# Patient Record
Sex: FEMALE | Race: WHITE
[De-identification: names, ages, dates, MRNs, and addresses within clinical notes are randomized per-mention and may not be internally consistent; named-entity substitution may affect disease eponyms.]

---
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
## 2021-01-07 ENCOUNTER — EMERGENCY (EMERGENCY)
Facility: HOSPITAL | Age: 21
LOS: 0 days | Discharge: HOME | End: 2021-01-07
Attending: EMERGENCY MEDICINE | Admitting: EMERGENCY MEDICINE
Payer: COMMERCIAL

## 2021-01-07 VITALS — HEART RATE: 54 BPM | DIASTOLIC BLOOD PRESSURE: 65 MMHG | SYSTOLIC BLOOD PRESSURE: 105 MMHG

## 2021-01-07 VITALS
HEIGHT: 64 IN | SYSTOLIC BLOOD PRESSURE: 121 MMHG | DIASTOLIC BLOOD PRESSURE: 68 MMHG | TEMPERATURE: 98 F | HEART RATE: 87 BPM | OXYGEN SATURATION: 97 % | WEIGHT: 270.07 LBS | RESPIRATION RATE: 20 BRPM

## 2021-01-07 DIAGNOSIS — R06.02 SHORTNESS OF BREATH: ICD-10-CM

## 2021-01-07 DIAGNOSIS — Z88.0 ALLERGY STATUS TO PENICILLIN: ICD-10-CM

## 2021-01-07 DIAGNOSIS — Z79.84 LONG TERM (CURRENT) USE OF ORAL HYPOGLYCEMIC DRUGS: ICD-10-CM

## 2021-01-07 DIAGNOSIS — R19.7 DIARRHEA, UNSPECIFIED: ICD-10-CM

## 2021-01-07 DIAGNOSIS — R11.2 NAUSEA WITH VOMITING, UNSPECIFIED: ICD-10-CM

## 2021-01-07 DIAGNOSIS — R05 COUGH: ICD-10-CM

## 2021-01-07 DIAGNOSIS — F17.200 NICOTINE DEPENDENCE, UNSPECIFIED, UNCOMPLICATED: ICD-10-CM

## 2021-01-07 DIAGNOSIS — F90.9 ATTENTION-DEFICIT HYPERACTIVITY DISORDER, UNSPECIFIED TYPE: ICD-10-CM

## 2021-01-07 DIAGNOSIS — R63.0 ANOREXIA: ICD-10-CM

## 2021-01-07 DIAGNOSIS — R07.89 OTHER CHEST PAIN: ICD-10-CM

## 2021-01-07 DIAGNOSIS — Z20.822 CONTACT WITH AND (SUSPECTED) EXPOSURE TO COVID-19: ICD-10-CM

## 2021-01-07 DIAGNOSIS — E11.9 TYPE 2 DIABETES MELLITUS WITHOUT COMPLICATIONS: ICD-10-CM

## 2021-01-07 LAB
ALBUMIN SERPL ELPH-MCNC: 4.2 G/DL — SIGNIFICANT CHANGE UP (ref 3.5–5.2)
ALP SERPL-CCNC: 54 U/L — SIGNIFICANT CHANGE UP (ref 30–115)
ALT FLD-CCNC: 25 U/L — SIGNIFICANT CHANGE UP (ref 14–37)
ANION GAP SERPL CALC-SCNC: 11 MMOL/L — SIGNIFICANT CHANGE UP (ref 7–14)
APAP SERPL-MCNC: <5 UG/ML — LOW (ref 10–30)
AST SERPL-CCNC: 26 U/L — SIGNIFICANT CHANGE UP (ref 14–37)
BASOPHILS # BLD AUTO: 0.05 K/UL — SIGNIFICANT CHANGE UP (ref 0–0.2)
BASOPHILS NFR BLD AUTO: 0.4 % — SIGNIFICANT CHANGE UP (ref 0–1)
BILIRUB SERPL-MCNC: 0.5 MG/DL — SIGNIFICANT CHANGE UP (ref 0.2–1.2)
BUN SERPL-MCNC: 12 MG/DL — SIGNIFICANT CHANGE UP (ref 10–20)
CALCIUM SERPL-MCNC: 9.4 MG/DL — SIGNIFICANT CHANGE UP (ref 8.5–10.1)
CHLORIDE SERPL-SCNC: 104 MMOL/L — SIGNIFICANT CHANGE UP (ref 98–110)
CO2 SERPL-SCNC: 24 MMOL/L — SIGNIFICANT CHANGE UP (ref 17–32)
CREAT SERPL-MCNC: 0.7 MG/DL — SIGNIFICANT CHANGE UP (ref 0.3–1)
EOSINOPHIL # BLD AUTO: 0.13 K/UL — SIGNIFICANT CHANGE UP (ref 0–0.7)
EOSINOPHIL NFR BLD AUTO: 0.9 % — SIGNIFICANT CHANGE UP (ref 0–8)
ETHANOL SERPL-MCNC: <10 MG/DL — SIGNIFICANT CHANGE UP
GLUCOSE SERPL-MCNC: 84 MG/DL — SIGNIFICANT CHANGE UP (ref 70–99)
HCT VFR BLD CALC: 38.6 % — SIGNIFICANT CHANGE UP (ref 37–47)
HGB BLD-MCNC: 12 G/DL — SIGNIFICANT CHANGE UP (ref 12–16)
IMM GRANULOCYTES NFR BLD AUTO: 0.4 % — HIGH (ref 0.1–0.3)
LIDOCAIN IGE QN: 23 U/L — SIGNIFICANT CHANGE UP (ref 7–60)
LYMPHOCYTES # BLD AUTO: 25.2 % — SIGNIFICANT CHANGE UP (ref 20.5–51.1)
LYMPHOCYTES # BLD AUTO: 3.5 K/UL — HIGH (ref 1.2–3.4)
MCHC RBC-ENTMCNC: 25.6 PG — LOW (ref 27–31)
MCHC RBC-ENTMCNC: 31.1 G/DL — LOW (ref 32–37)
MCV RBC AUTO: 82.5 FL — SIGNIFICANT CHANGE UP (ref 81–99)
MONOCYTES # BLD AUTO: 1.08 K/UL — HIGH (ref 0.1–0.6)
MONOCYTES NFR BLD AUTO: 7.8 % — SIGNIFICANT CHANGE UP (ref 1.7–9.3)
NEUTROPHILS # BLD AUTO: 9.1 K/UL — HIGH (ref 1.4–6.5)
NEUTROPHILS NFR BLD AUTO: 65.3 % — SIGNIFICANT CHANGE UP (ref 42.2–75.2)
NRBC # BLD: 0 /100 WBCS — SIGNIFICANT CHANGE UP (ref 0–0)
PLATELET # BLD AUTO: 363 K/UL — SIGNIFICANT CHANGE UP (ref 130–400)
POTASSIUM SERPL-MCNC: 4.1 MMOL/L — SIGNIFICANT CHANGE UP (ref 3.5–5)
POTASSIUM SERPL-SCNC: 4.1 MMOL/L — SIGNIFICANT CHANGE UP (ref 3.5–5)
PROT SERPL-MCNC: 7.6 G/DL — SIGNIFICANT CHANGE UP (ref 6.1–8)
RBC # BLD: 4.68 M/UL — SIGNIFICANT CHANGE UP (ref 4.2–5.4)
RBC # FLD: 13.2 % — SIGNIFICANT CHANGE UP (ref 11.5–14.5)
SALICYLATES SERPL-MCNC: <0.3 MG/DL — LOW (ref 4–30)
SODIUM SERPL-SCNC: 139 MMOL/L — SIGNIFICANT CHANGE UP (ref 135–146)
WBC # BLD: 13.91 K/UL — HIGH (ref 4.8–10.8)
WBC # FLD AUTO: 13.91 K/UL — HIGH (ref 4.8–10.8)

## 2021-01-07 PROCEDURE — 99285 EMERGENCY DEPT VISIT HI MDM: CPT

## 2021-01-07 PROCEDURE — 71045 X-RAY EXAM CHEST 1 VIEW: CPT | Mod: 26

## 2021-01-07 RX ORDER — SODIUM CHLORIDE 9 MG/ML
1000 INJECTION INTRAMUSCULAR; INTRAVENOUS; SUBCUTANEOUS ONCE
Refills: 0 | Status: COMPLETED | OUTPATIENT
Start: 2021-01-07 | End: 2021-01-07

## 2021-01-07 RX ADMIN — SODIUM CHLORIDE 1000 MILLILITER(S): 9 INJECTION INTRAMUSCULAR; INTRAVENOUS; SUBCUTANEOUS at 10:32

## 2021-01-07 RX ADMIN — SODIUM CHLORIDE 1000 MILLILITER(S): 9 INJECTION INTRAMUSCULAR; INTRAVENOUS; SUBCUTANEOUS at 11:28

## 2021-01-07 NOTE — ED PROVIDER NOTE - OBJECTIVE STATEMENT
21 yo F c/o body aches, chest discomfort, n/v, cough and decreased PO intake x1.5 days. +diarrhea today. No fevers, chills,  SOB, abdominal pains, or legs pains. + sick contact a few weeks ago. COVID test negative this past week at [facility].

## 2021-01-07 NOTE — ED PROVIDER NOTE - NSFOLLOWUPINSTRUCTIONS_ED_ALL_ED_FT
Nausea / Vomiting    Nausea is the feeling that you have an upset stomach or have to vomit. As nausea gets worse, it can lead to vomiting. Vomiting occurs when stomach contents are thrown up and out of the mouth which puts you at an increased risk for dehydration. Older adults and people with other diseases or a weak immune system are at higher risk for dehydration. Drink clear fluids in small but frequent amounts as tolerated. Eat bland, easy-to-digest foods in small amounts as tolerated.     Novel Coronavirus (COVID-19)  The Facts  What is a coronavirus?  Coronaviruses are a large family of viruses that cause illnesses ranging from the common cold  to more severe diseases such as Middle East Respiratory Syndrome (MERS) and Severe Acute  Respiratory Syndrome (SARS).  What is Novel Coronavirus (COVID-19)?  COVID-19 is a new strain of Coronavirus that has not been previously identified in humans. COVID-19  was identified in [place], [place], [place] in December 2019 (COVID-19). COVID-19 has  since been identified outside of [place], in a growing number of countries internationally, including  the [place].  Where can I find the most recent information about COVID-19?  The Centers for Disease Control and Prevention (CDC) is closely monitoring the outbreak caused by the  COVID-19. For the latest information about COVID- 19, visit the CDC website at  https://www.cdc.gov/coronavirus/index.html  How are coronaviruses spread?  Coronaviruses can be transmitted from person-to- person, usually after close contact with an infected person,  for example, in a household, workplace, or healthcare setting via droplets that become airborne after a cough  or sneeze by an affected person. These droplets can then infect a nearby person. It is likely transmission also  occurs by touching recently contaminated surfaces.  What are the symptoms of coronavirus infection?  It depends on the virus, but common signs include fever and/or respiratory symptoms such as  cough and shortness of breath. In more severe cases, infection can cause pneumonia, severe acute  respiratory syndrome, kidney failure and even death. Fortunately, most cases of COVID-19 have an  illness no different than the influenza “flu”. With a majority of these patients having mild symptoms  and overall mortality which appears to be not much different than the flu.  Is there a treatment for a COVID-19?  There is no specific treatment for disease caused by COVID-19. However, many of the symptoms can  be treated based on the patient’s clinical condition. Supportive care for infected persons can be highly  effective.  What can I do to protect myself?  Washing your hands, covering your cough, and disinfecting surfaces are the best precautionary  measures. It is also advisable to avoid close contact with anyone showing symptoms of respiratory  illness such as coughing and sneezing. Those with symptoms should wear a surgical mask when  around others.  What can I do to protect those around me?  If you have been identified as someone who may be infected with COVID-19, we recommend you  follow the self-isolation procedures outlined below to protect those around you and limit the spread  of this virus.   March 3, 2020  Recommendations for Patients Advised to Self-Isolate  for Possible COVID-19 Exposure  We recommend the below precautionary steps from now until 14 days from when you  returned from your travel or date of your last known possible contact:  - Do not go to work, school, or public areas. Avoid using public transportation, ride-sharing, or  taxis.  - As much as possible, separate yourself from other people in your home. If you can, you should  stay in a room and away from other people in your home. Also, you should use a separate  bathroom, if available.  - Wear the supplied mask whenever you are around other people.  - If you have a non-urgent medical appointment, please reschedule for a later date. If the  appointment is urgent, please call the healthcare provider and tell them that you are on selfisolation for possible COVID-19. This will help the healthcare provider’s office take steps to keep  other people from getting infected or exposed. If you can reschedule routine appointments, do  so.  - Wash your hands often with soap and water for at least 15 to 20 seconds or clean your hands  with an alcohol-based hand  that contains 60 to 95% alcohol, covering all surfaces of  your hands and rubbing them together until they feel dry. Soap and water should be used  preferentially if hands are visibly dirty.  - Cover your mouth and nose with a tissue when you cough or sneeze. Throw used tissues in a  lined trash can; immediately wash your hands.  - Avoid touching your eyes, nose, and mouth with your hands.  - Avoid sharing personal household items. You should not share dishes, drinking glasses, cups,  eating utensils, towels, or bedding with other people or pets in your home. After using these  items, they should be washed thoroughly with soap and water.  - Clean and disinfect all “high-touch” surfaces every day. High touch surfaces include counters,  tabletops, doorknobs, light switches, remote controls, bathroom fixtures, toilets, phones,  keyboards, tablets, and bedside tables. Also, clean any surfaces that may have blood, stool, or  body fluids on them.       If you develop worsening symptoms:  During your time on self-isolation do the following:  - Work from home if you are able to so.  - Limit social isolation by talking with friends and family on the phone or with face-time  - Talk with friends and relatives who don’t live with you about supporting each other if one  household has to be quarantined. For example, agree to drop groceries or other supplies at the  front door.  - Exercise and spend time outdoors away from others if able to do so.      You should return to the Emergency Department if you develop worse symptoms, trouble  breathing, chest pain, and/or a fever that doesn’t improve with over the counter  acetaminophen or ibuprofen.

## 2021-01-07 NOTE — ED ADULT NURSE NOTE - NSIMPLEMENTINTERV_GEN_ALL_ED
Implemented All Universal Safety Interventions:  [place] to call system. Call bell, personal items and telephone within reach. Instruct patient to call for assistance. Room bathroom lighting operational. Non-slip footwear when patient is off stretcher. Physically safe environment: no spills, clutter or unnecessary equipment. Stretcher in lowest position, wheels locked, appropriate side rails in place.

## 2021-01-07 NOTE — ED PROVIDER NOTE - NS ED ROS FT
Review of Systems    Constitutional: (-) fever, (-) chills  Eyes/ENT: (-) blurry vision, (-) epistaxis, (-) sore throat  Cardiovascular: (+) chest pain, (-) syncope  Respiratory: (+) cough, (-) shortness of breath  Gastrointestinal: (-) pain, (+) nausea, (+) vomiting, (+) diarrhea  Musculoskeletal: (-) neck pain, (-) back pain, (+) body aches  Integumentary: (-) rash, (-) edema  Neurological: (-) headache, (-) altered mental status  Psychiatric: (-) hallucinations  Allergic/Immunologic: (-) pruritus

## 2021-01-07 NOTE — ED PROVIDER NOTE - CLINICAL SUMMARY MEDICAL DECISION MAKING FREE TEXT BOX
Patient has improved with iv fluids pain is tolerable she is tolerating po food and fluid I will discharge at this time

## 2021-01-07 NOTE — ED PROVIDER NOTE - PROGRESS NOTE DETAILS
Patient refused COVID test, zofran, and tylenol. Patient reports feeling depressed to Dr Alexander. Denies homicidal or suicidal ideations. Advised outpatient f/u with psych. Patient feeling better I personally evaluated the patient. I reviewed the Resident’s or Physician Assistant’s note (as assigned above), and agree with the findings and plan except as documented in my note.  19 y/o F with no PMHx presents c/o CP and vague body pain “all over” x3 days. Pt states sxs are associated with 3 episodes of vomiting and 1 episode of diarrhea during this time, but none today. CP is localized to her sternum without radiation. She also states she is “depressed”, but gives no specific reason other than “a lot of things”. Denies SI/HI, visual changes and AVH. No fever, chills, cough, diaphoresis, numbness, weakness or tingling. On exam: NCAT. PERRLA, EOMI. OP clear. Lungs CTAB. RRR, S1S2 noted. Radial pulses 2+ and equal, pedal pulses 2+ and equal. Abdomen soft, NT/ND, no rebound or guarding. FROM x4 extremities. No focal neuro deficits. A/P: pt presented for vomiting and diarrhea, therefore administered IVF and obtained labs. Pt tolerating PO. In addition, pt had complaints of depression so tylenol salicylate levels were added, which were negative. EKG normal. Pt improved and d/c.

## 2021-01-07 NOTE — ED PROVIDER NOTE - ATTENDING CONTRIBUTION TO CARE
I was present for and supervised the key and critical aspects of the procedures performed during the care of the patient. I personally evaluated the patient. I reviewed the Resident’s or Physician Assistant’s note (as assigned above), and agree with the findings and plan except as documented in my note.  21 y/o F with no PMHx presents c/o CP and vague body pain “all over” x3 days. Pt states sxs are associated with 3 episodes of vomiting and 1 episode of diarrhea during this time, but none today. CP is localized to her sternum without radiation. She also states she is “depressed”, but gives no specific reason other than “a lot of things”. Denies SI/HI, visual changes and AVH. No fever, chills, cough, diaphoresis, numbness, weakness or tingling. On exam: NCAT. PERRLA, EOMI. OP clear. Lungs CTAB. RRR, S1S2 noted. Radial pulses 2+ and equal, pedal pulses 2+ and equal. Abdomen soft, NT/ND, no rebound or guarding. FROM x4 extremities. No focal neuro deficits. A/P: pt presented for vomiting and diarrhea, therefore administered IVF and obtained labs. Pt tolerating PO. In addition, pt had complaints of depression so tylenol salicylate levels were added, which were negative. EKG normal. Pt improved and d/c.

## 2021-04-09 ENCOUNTER — TRANSCRIPTION ENCOUNTER (OUTPATIENT)
Age: 21
End: 2021-04-09

## 2021-06-23 ENCOUNTER — APPOINTMENT (OUTPATIENT)
Dept: OTOLARYNGOLOGY | Facility: CLINIC | Age: 21
End: 2021-06-23
Payer: MEDICAID

## 2021-06-23 DIAGNOSIS — J30.9 ALLERGIC RHINITIS, UNSPECIFIED: ICD-10-CM

## 2021-06-23 DIAGNOSIS — R07.0 PAIN IN THROAT: ICD-10-CM

## 2021-06-23 PROCEDURE — 99072 ADDL SUPL MATRL&STAF TM PHE: CPT

## 2021-06-23 PROCEDURE — 99213 OFFICE O/P EST LOW 20 MIN: CPT

## 2021-06-23 RX ORDER — FAMOTIDINE 20 MG/1
20 TABLET, FILM COATED ORAL
Qty: 90 | Refills: 2 | Status: ACTIVE | COMMUNITY
Start: 2021-06-23 | End: 1900-01-01

## 2021-06-23 NOTE — HISTORY OF PRESENT ILLNESS
[de-identified] : 12/3/19 Patient is present today for right sided throat pain, and b/l otalgia for 1 month. patient was instructed to use ibuprofen for pain as per urgent care, but patient is still in pain. patient also states she has nasal pain, and facial pain. she currently has a cough that has a metallic taste mucosa. patient refuses endoscopy.  [FreeTextEntry1] : 06/23/21 : Patient presents today c/o throat discomfort. She is constantly clearing throat. Has excessive saliva , constantly having to spit it up .

## 2021-08-17 ENCOUNTER — TRANSCRIPTION ENCOUNTER (OUTPATIENT)
Age: 21
End: 2021-08-17

## 2021-10-21 ENCOUNTER — EMERGENCY (EMERGENCY)
Facility: HOSPITAL | Age: 21
LOS: 0 days | Discharge: HOME | End: 2021-10-21
Attending: EMERGENCY MEDICINE | Admitting: EMERGENCY MEDICINE
Payer: MEDICAID

## 2021-10-21 VITALS
HEART RATE: 80 BPM | TEMPERATURE: 99 F | DIASTOLIC BLOOD PRESSURE: 71 MMHG | SYSTOLIC BLOOD PRESSURE: 126 MMHG | HEIGHT: 64 IN

## 2021-10-21 DIAGNOSIS — Z86.59 PERSONAL HISTORY OF OTHER MENTAL AND BEHAVIORAL DISORDERS: ICD-10-CM

## 2021-10-21 DIAGNOSIS — K92.1 MELENA: ICD-10-CM

## 2021-10-21 DIAGNOSIS — R10.30 LOWER ABDOMINAL PAIN, UNSPECIFIED: ICD-10-CM

## 2021-10-21 DIAGNOSIS — Z88.0 ALLERGY STATUS TO PENICILLIN: ICD-10-CM

## 2021-10-21 DIAGNOSIS — E11.9 TYPE 2 DIABETES MELLITUS WITHOUT COMPLICATIONS: ICD-10-CM

## 2021-10-21 DIAGNOSIS — G89.29 OTHER CHRONIC PAIN: ICD-10-CM

## 2021-10-21 DIAGNOSIS — Z79.84 LONG TERM (CURRENT) USE OF ORAL HYPOGLYCEMIC DRUGS: ICD-10-CM

## 2021-10-21 DIAGNOSIS — Z87.42 PERSONAL HISTORY OF OTHER DISEASES OF THE FEMALE GENITAL TRACT: ICD-10-CM

## 2021-10-21 DIAGNOSIS — R10.9 UNSPECIFIED ABDOMINAL PAIN: ICD-10-CM

## 2021-10-21 LAB
ALBUMIN SERPL ELPH-MCNC: 4.3 G/DL — SIGNIFICANT CHANGE UP (ref 3.5–5.2)
ALP SERPL-CCNC: 69 U/L — SIGNIFICANT CHANGE UP (ref 30–115)
ALT FLD-CCNC: 16 U/L — SIGNIFICANT CHANGE UP (ref 14–37)
ANION GAP SERPL CALC-SCNC: 13 MMOL/L — SIGNIFICANT CHANGE UP (ref 7–14)
APPEARANCE UR: CLEAR — SIGNIFICANT CHANGE UP
AST SERPL-CCNC: 20 U/L — SIGNIFICANT CHANGE UP (ref 14–37)
BASOPHILS # BLD AUTO: 0.06 K/UL — SIGNIFICANT CHANGE UP (ref 0–0.2)
BASOPHILS NFR BLD AUTO: 0.5 % — SIGNIFICANT CHANGE UP (ref 0–1)
BILIRUB SERPL-MCNC: 0.3 MG/DL — SIGNIFICANT CHANGE UP (ref 0.2–1.2)
BILIRUB UR-MCNC: NEGATIVE — SIGNIFICANT CHANGE UP
BUN SERPL-MCNC: 12 MG/DL — SIGNIFICANT CHANGE UP (ref 10–20)
CALCIUM SERPL-MCNC: 9.4 MG/DL — SIGNIFICANT CHANGE UP (ref 8.5–10.1)
CHLORIDE SERPL-SCNC: 106 MMOL/L — SIGNIFICANT CHANGE UP (ref 98–110)
CO2 SERPL-SCNC: 19 MMOL/L — SIGNIFICANT CHANGE UP (ref 17–32)
COLOR SPEC: YELLOW — SIGNIFICANT CHANGE UP
CREAT SERPL-MCNC: 0.7 MG/DL — SIGNIFICANT CHANGE UP (ref 0.3–1)
DIFF PNL FLD: NEGATIVE — SIGNIFICANT CHANGE UP
EOSINOPHIL # BLD AUTO: 0.19 K/UL — SIGNIFICANT CHANGE UP (ref 0–0.7)
EOSINOPHIL NFR BLD AUTO: 1.4 % — SIGNIFICANT CHANGE UP (ref 0–8)
GLUCOSE SERPL-MCNC: 89 MG/DL — SIGNIFICANT CHANGE UP (ref 70–99)
GLUCOSE UR QL: NEGATIVE MG/DL — SIGNIFICANT CHANGE UP
HCT VFR BLD CALC: 43.3 % — SIGNIFICANT CHANGE UP (ref 37–47)
HGB BLD-MCNC: 13.7 G/DL — SIGNIFICANT CHANGE UP (ref 12–16)
IMM GRANULOCYTES NFR BLD AUTO: 0.7 % — HIGH (ref 0.1–0.3)
KETONES UR-MCNC: NEGATIVE — SIGNIFICANT CHANGE UP
LEUKOCYTE ESTERASE UR-ACNC: NEGATIVE — SIGNIFICANT CHANGE UP
LYMPHOCYTES # BLD AUTO: 42.9 % — SIGNIFICANT CHANGE UP (ref 20.5–51.1)
LYMPHOCYTES # BLD AUTO: 5.66 K/UL — HIGH (ref 1.2–3.4)
MCHC RBC-ENTMCNC: 26 PG — LOW (ref 27–31)
MCHC RBC-ENTMCNC: 31.6 G/DL — LOW (ref 32–37)
MCV RBC AUTO: 82.2 FL — SIGNIFICANT CHANGE UP (ref 81–99)
MONOCYTES # BLD AUTO: 1.03 K/UL — HIGH (ref 0.1–0.6)
MONOCYTES NFR BLD AUTO: 7.8 % — SIGNIFICANT CHANGE UP (ref 1.7–9.3)
NEUTROPHILS # BLD AUTO: 6.15 K/UL — SIGNIFICANT CHANGE UP (ref 1.4–6.5)
NEUTROPHILS NFR BLD AUTO: 46.7 % — SIGNIFICANT CHANGE UP (ref 42.2–75.2)
NITRITE UR-MCNC: NEGATIVE — SIGNIFICANT CHANGE UP
NRBC # BLD: 0 /100 WBCS — SIGNIFICANT CHANGE UP (ref 0–0)
PH UR: 6.5 — SIGNIFICANT CHANGE UP (ref 5–8)
PLATELET # BLD AUTO: 469 K/UL — HIGH (ref 130–400)
POTASSIUM SERPL-MCNC: 5.1 MMOL/L — HIGH (ref 3.5–5)
POTASSIUM SERPL-SCNC: 5.1 MMOL/L — HIGH (ref 3.5–5)
PROT SERPL-MCNC: 7.8 G/DL — SIGNIFICANT CHANGE UP (ref 6.1–8)
PROT UR-MCNC: NEGATIVE MG/DL — SIGNIFICANT CHANGE UP
RBC # BLD: 5.27 M/UL — SIGNIFICANT CHANGE UP (ref 4.2–5.4)
RBC # FLD: 13.1 % — SIGNIFICANT CHANGE UP (ref 11.5–14.5)
SODIUM SERPL-SCNC: 138 MMOL/L — SIGNIFICANT CHANGE UP (ref 135–146)
SP GR SPEC: 1.02 — SIGNIFICANT CHANGE UP (ref 1.01–1.03)
UROBILINOGEN FLD QL: 0.2 MG/DL — SIGNIFICANT CHANGE UP
WBC # BLD: 13.18 K/UL — HIGH (ref 4.8–10.8)
WBC # FLD AUTO: 13.18 K/UL — HIGH (ref 4.8–10.8)

## 2021-10-21 PROCEDURE — 76856 US EXAM PELVIC COMPLETE: CPT | Mod: 26

## 2021-10-21 PROCEDURE — 99285 EMERGENCY DEPT VISIT HI MDM: CPT

## 2021-10-21 RX ORDER — KETOROLAC TROMETHAMINE 30 MG/ML
15 SYRINGE (ML) INJECTION ONCE
Refills: 0 | Status: DISCONTINUED | OUTPATIENT
Start: 2021-10-21 | End: 2021-10-21

## 2021-10-21 RX ORDER — SODIUM CHLORIDE 9 MG/ML
1000 INJECTION INTRAMUSCULAR; INTRAVENOUS; SUBCUTANEOUS ONCE
Refills: 0 | Status: COMPLETED | OUTPATIENT
Start: 2021-10-21 | End: 2021-10-21

## 2021-10-21 RX ADMIN — Medication 15 MILLIGRAM(S): at 15:02

## 2021-10-21 RX ADMIN — SODIUM CHLORIDE 1000 MILLILITER(S): 9 INJECTION INTRAMUSCULAR; INTRAVENOUS; SUBCUTANEOUS at 15:02

## 2021-10-21 NOTE — ED PROVIDER NOTE - PHYSICAL EXAMINATION
Physical Exam    Vital Signs: I have reviewed the initial vital signs.  Constitutional: well-nourished, appears stated age, no acute distress  Cardiovascular: S1 and S2, regular rate, regular rhythm, well-perfused extremities, radial pulses equal and 2+  Respiratory: unlabored respiratory effort, clear to auscultation bilaterally no wheezing, rales and rhonchi  Gastrointestinal: soft, (+) diffuse lower ABD tenderness, no rebound or guarding, no pulsatile mass, normal bowl sounds  Musculoskeletal: supple neck, no lower extremity edema, no midline tenderness  Integumentary: warm, dry, no rash  Neurologic: awake, alert, motor and sensory functions grossly intact  Psychiatric: appropriate mood, appropriate affect Physical Exam    Vital Signs: I have reviewed the initial vital signs.  Constitutional: well-nourished, appears stated age, no acute distress  Cardiovascular: S1 and S2, regular rate, regular rhythm, well-perfused extremities, radial pulses equal and 2+  Respiratory: unlabored respiratory effort, clear to auscultation bilaterally no wheezing, rales and rhonchi  Gastrointestinal: soft, (+) diffuse lower ABD tenderness, no rebound or guarding, no pulsatile mass, normal bowl sounds. Rectal: No blood, hemorrhoids, or fissures.    Musculoskeletal: supple neck, no lower extremity edema, no midline tenderness  Integumentary: warm, dry, no rash  Neurologic: awake, alert, motor and sensory functions grossly intact  Psychiatric: appropriate mood, appropriate affect Physical Exam    Vital Signs: I have reviewed the initial vital signs.  Constitutional: well-nourished, appears stated age, no acute distress  Cardiovascular: S1 and S2, regular rate, regular rhythm, well-perfused extremities, radial pulses equal and 2+  Respiratory: unlabored respiratory effort, clear to auscultation bilaterally no wheezing, rales and rhonchi  Gastrointestinal: soft, (+) diffuse lower ABD tenderness, no rebound or guarding, no pulsatile mass, normal bowl sounds. Rectal: No blood, hemorrhoids, or fissures.    Musculoskeletal: supple neck, no lower extremity edema, no midline tenderness  Integumentary: warm, dry, no rash  Neurologic: awake, alert, motor and sensory functions grossly intact

## 2021-10-21 NOTE — ED PROVIDER NOTE - ATTENDING CONTRIBUTION TO CARE
pt with chronic abd pain found to have gallstones and ovarian cyst now with lower abd pain and brbpr, PE as above, will cehck imaging

## 2021-10-21 NOTE — ED PROVIDER NOTE - CLINICAL SUMMARY MEDICAL DECISION MAKING FREE TEXT BOX
pt with chronic abd pain found to have gallstones and ovarian cyst now with lower abd pain and brbpr, PE as above, labs and studies reviewed, will d/c to f/u as scheduled. Patient counseled regarding conditions which should prompt return.

## 2021-10-21 NOTE — ED PROVIDER NOTE - PATIENT PORTAL LINK FT
You can access the FollowMyHealth Patient Portal offered by [facility] by registering at the following website: [email or web address]. By joining [employer]’s FollowMyHealth portal, you will also be able to view your health information using other applications (apps) compatible with our system.

## 2021-10-21 NOTE — ED PROVIDER NOTE - OBJECTIVE STATEMENT
21 y/o F PMH ADD, polycystic ovary, depression and DM type 2 presents to the ED c/o diffuse ABD pain, now radiating to her suprapubic region. Pt states that over the past few months, the pain has been progressively getting worse, non-radiating, but associated with bloody stools. Pt has been evaluated for this pain in the past with labs and US, which were positive for platelets of 400 and noted gallstones. Pt denies any recent fevers, chills, cough, CP, SOB, back pain or decrease in PO intake.

## 2021-10-21 NOTE — ED PROVIDER NOTE - NS ED ROS FT
Constitutional: (-) fever, (-) chills  Eyes: (-) visual changes  ENT: (-) nasal congestions  Cardiovascular: (-) chest pain, (-) syncope  Respiratory: (-) cough, (-) shortness of breath, (-) dyspnea,   Gastrointestinal: (-) vomiting, (-) diarrhea, (-)nausea, (+) ABD pain  Musculoskeletal: (-) neck pain, (-) back pain, (-) joint pain,  Integumentary: (-) rash, (-) edema, (-) bruises  Neurological: (-) headache, (-) loc, (-) dizziness, (-) tingling, (-)numbness,  Psychiatric: (-) hallucinations, (-)nervousness, (-)depression, (-)SI/HI  Allergic/Immunologic: (-) pruritus

## 2021-10-22 NOTE — ASU PREOP CHECKLIST, PEDIATRIC - RESPIRATORY RATE (BREATHS/MIN)
Anesthesia Evaluation     Patient summary reviewed and Nursing notes reviewed   no history of anesthetic complications:  NPO Solid Status: > 8 hours  NPO Liquid Status: > 2 hours           Airway   Mallampati: II  TM distance: >3 FB  Neck ROM: full  No difficulty expected  Dental    (+) edentulous    Pulmonary - normal exam    breath sounds clear to auscultation  (+) a smoker Former,   Cardiovascular - normal exam  Exercise tolerance: poor (<4 METS)    Rhythm: regular    (+) hypertension, hyperlipidemia,       Neuro/Psych  (+) TIA,     GI/Hepatic/Renal/Endo    (+)  GERD,      Musculoskeletal     Abdominal    Substance History      OB/GYN          Other      history of cancer (prior lung CA, now epiglottic mass, new cerebellar mass) active                    Anesthesia Plan    ASA 4     general and MAC   total IV anesthesia    Anesthetic plan, all risks, benefits, and alternatives have been provided, discussed and informed consent has been obtained with: patient and child.      
18

## 2021-10-23 LAB
CULTURE RESULTS: SIGNIFICANT CHANGE UP
SPECIMEN SOURCE: SIGNIFICANT CHANGE UP

## 2021-10-24 NOTE — ED PROVIDER NOTE - NS ED ATTENDING STATEMENT MOD
Yes - the patient is able to be screened
I have personally performed a face to face diagnostic evaluation on this patient. I have reviewed the ACP note and agree with the history, exam and plan of care, except as noted.

## 2022-01-31 ENCOUNTER — EMERGENCY (EMERGENCY)
Facility: HOSPITAL | Age: 22
LOS: 0 days | Discharge: HOME | End: 2022-01-31
Attending: EMERGENCY MEDICINE | Admitting: EMERGENCY MEDICINE
Payer: MEDICAID

## 2022-01-31 VITALS
HEART RATE: 92 BPM | RESPIRATION RATE: 18 BRPM | DIASTOLIC BLOOD PRESSURE: 80 MMHG | SYSTOLIC BLOOD PRESSURE: 139 MMHG | OXYGEN SATURATION: 100 %

## 2022-01-31 VITALS
HEART RATE: 120 BPM | SYSTOLIC BLOOD PRESSURE: 148 MMHG | RESPIRATION RATE: 18 BRPM | OXYGEN SATURATION: 100 % | DIASTOLIC BLOOD PRESSURE: 92 MMHG | TEMPERATURE: 98 F

## 2022-01-31 DIAGNOSIS — L02.415 CUTANEOUS ABSCESS OF RIGHT LOWER LIMB: ICD-10-CM

## 2022-01-31 DIAGNOSIS — Z79.84 LONG TERM (CURRENT) USE OF ORAL HYPOGLYCEMIC DRUGS: ICD-10-CM

## 2022-01-31 DIAGNOSIS — Z88.0 ALLERGY STATUS TO PENICILLIN: ICD-10-CM

## 2022-01-31 PROCEDURE — 99284 EMERGENCY DEPT VISIT MOD MDM: CPT | Mod: 25

## 2022-01-31 PROCEDURE — 76882 US LMTD JT/FCL EVL NVASC XTR: CPT | Mod: 26

## 2022-01-31 PROCEDURE — 10060 I&D ABSCESS SIMPLE/SINGLE: CPT

## 2022-01-31 RX ORDER — AZTREONAM 2 G
1 VIAL (EA) INJECTION
Qty: 14 | Refills: 0
Start: 2022-01-31 | End: 2022-02-06

## 2022-01-31 RX ORDER — SACCHAROMYCES BOULARDII 250 MG
1 POWDER IN PACKET (EA) ORAL
Qty: 14 | Refills: 0
Start: 2022-01-31 | End: 2022-02-13

## 2022-01-31 NOTE — ED PROVIDER NOTE - OBJECTIVE STATEMENT
21 F pmhx of recurrent abscess to the inner right thigh presents tot the ED for abscess to the right thigh. pt stats she is coming in today because the pain has gotten too much and she is unable to walk. pt states over the last few years she has gotten them and they usually go away on their own. denies any relieving factors. denies any itching or drainage from the wound.

## 2022-01-31 NOTE — ED PROVIDER NOTE - CLINICAL SUMMARY MEDICAL DECISION MAKING FREE TEXT BOX
21-year-old woman with history of recurrent inner thigh abscesses.  Patient had recurrence of right thigh abscess.  No fever no lethargy no signs of severe sepsis or septic shock.  No evidence of necrotizing fasciitis.  Ultrasound revealed abscess.  I&D performed with good amount of purulent drainage.  Stable for discharge with oral antibiotics Florastor outpatient follow-up.  Patient to return for any new or worsening symptoms.

## 2022-01-31 NOTE — ED PROVIDER NOTE - NSFOLLOWUPCLINICS_GEN_ALL_ED_FT
[facility]  OB/GYN  [address] [place] [address]  Phone: [phone]  Fax:     [facility]  Family Medicine  [address] [place] [address]  Phone: [phone]  Fax:

## 2022-01-31 NOTE — ED PROVIDER NOTE - PATIENT PORTAL LINK FT
You can access the FollowMyHealth Patient Portal offered by [facility] by registering at the following website: [email or web address]. By joining [employer]’s FollowMyHealth portal, you will also be able to view your health information using other applications (apps) compatible with our system.

## 2022-01-31 NOTE — ED PROVIDER NOTE - NS ED ROS FT
Review of Systems  Constitutional:  No fever, chills, malaise, generalized weakness.  Cardiac:  No chest pain,  Respiratory:  No dyspnea,  :  No dysuria, hematuria, frequency, or burning. Normal urine output.   Skin:  No skin rash, pruritis, jaundice, + lesions.   Neuro:  No headache, dizziness,

## 2022-01-31 NOTE — ED PROVIDER NOTE - PHYSICAL EXAMINATION
English VITAL SIGNS: I have reviewed nursing notes and confirm.  CONSTITUTIONAL: Well-developed; well-nourished; in no acute distress.  SKIN: Skin exam is warm and dry, acute rash to the right groin signs of scarring from previous lesions. large fluctuant lesion on the right thigh, erythema and swelling   CARD: S1, S2 normal; no murmurs, gallops, or rubs. Regular rate and rhythm.  RESP: No wheezes, rales or rhonchi. Speaking in full sentences.

## 2022-01-31 NOTE — ED PROCEDURE NOTE - ATTENDING CONTRIBUTION TO CARE
I personally supervised the study.  I reviewed the images and interpretation by the resident/ACP and have edited where appropriate.    Patient came in with swelling I intend to get a bedside soft tissue US    Soft tissue US showed abscess
I was present for and supervised the key and critical aspects of the procedures performed during the care of the patient.  I&D

## 2022-03-09 ENCOUNTER — EMERGENCY (EMERGENCY)
Facility: HOSPITAL | Age: 22
LOS: 0 days | Discharge: HOME | End: 2022-03-09
Attending: EMERGENCY MEDICINE | Admitting: EMERGENCY MEDICINE
Payer: MEDICAID

## 2022-03-09 VITALS
WEIGHT: 285.94 LBS | RESPIRATION RATE: 19 BRPM | TEMPERATURE: 99 F | SYSTOLIC BLOOD PRESSURE: 113 MMHG | HEART RATE: 101 BPM | HEIGHT: 64 IN | OXYGEN SATURATION: 97 % | DIASTOLIC BLOOD PRESSURE: 57 MMHG

## 2022-03-09 DIAGNOSIS — Z88.0 ALLERGY STATUS TO PENICILLIN: ICD-10-CM

## 2022-03-09 DIAGNOSIS — S21.012A LACERATION WITHOUT FOREIGN BODY OF LEFT BREAST, INITIAL ENCOUNTER: ICD-10-CM
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
## 2022-03-09 PROCEDURE — 99283 EMERGENCY DEPT VISIT LOW MDM: CPT

## 2022-03-09 NOTE — ED PROVIDER NOTE - OBJECTIVE STATEMENT
this is a 20 yo female presents to ed for evaluation . patient states she had a nipple ring in and it was accidently ripped out. patient came to ed for further evaluation.

## 2022-03-09 NOTE — ED ADULT TRIAGE NOTE - DIRECT TO ROOM CARE INITIATED:
09-Mar-2022 14:08 72y Female presents today for presurgical testing for CYSTOSCOPY RIGHT URETEROSCOPY LASER LITHOTRIPSY STENT EXCHANGE. Patient reports vomiting and back pain since last Tuesday 2/8/22, for which she presented to the ER for evaluation. Workup revealed a stone in the ureter. She currently denies pain but endorses having had sharp pain radiating from her right costovertebral angle around to her right groin area, and was a 9/10 on pain scale. She states that she was treated with antibiotics and a ureteral stent was placed during that visit. She currently denies any pain or any hematuria. She offers no other complaints at this time.   Patient denies any CP, palpitations, SOB, cough, or dysuria. No recent URI or UTI. Recently treated with antibiotics for kidney stones  Stated exercise tolerance is FOS 3-4            JAKE screen reviewed    Patient denies any recent personal exposure to COVID19. Denies any sick contacts. Patient denies any travel within the past 30 days. Patient was instructed to quarantine until after procedure    N20.1 40242  Calculus of ureter  Encounter for other preprocedural examination  ^N20.1 47848    PMH/PSH/FH  Calculus of ureter    Anesthesia Alert  NO--Difficult Airway  NO--History of neck surgery or radiation  NO--Limited ROM of neck  NO--History of Malignant hyperthermia  NO--Personal or family history of Pseudocholinesterase deficiency.  NO--Prior Anesthesia Complication  NO--Latex Allergy  NO--Loose teeth  NO--History of Rheumatoid Arthritis  NO--JAKE  NO--Bleeding risk  NO--Other_____    Patient states that this is their complete medical history and list of medications

## 2022-03-09 NOTE — ED PROVIDER NOTE - PATIENT PORTAL LINK FT
You can access the FollowMyHealth Patient Portal offered by [facility] by registering at the following website: [email or web address]. By joining [employer]’s FollowMyHealth portal, you will also be able to view your health information using other applications (apps) compatible with our system.

## 2022-03-09 NOTE — ED PROVIDER NOTE - CARE PROVIDER_API CALL
Sincere Owen  PLASTIC SURGERY  [address] [place] [address]  Phone: [phone]  Fax: [phone]  Follow Up Time:

## 2022-03-09 NOTE — ED PROVIDER NOTE - PROGRESS NOTE DETAILS
area cleansed with normal saline.   steristrip applied to area.    patient given follow up with plastics.

## 2022-03-09 NOTE — ED PROVIDER NOTE - PHYSICAL EXAMINATION
--EXAM--  VITAL SIGNS: I have reviewed vs documented at present.  CONSTITUTIONAL: Well-developed; well-nourished; in no acute distress.   SKIN: left nipple there is a skin tear present no active bleeding.

## 2022-03-09 NOTE — ED PROVIDER NOTE - CLINICAL SUMMARY MEDICAL DECISION MAKING FREE TEXT BOX
Wound cleaned. Steri strips placed.  Pt instructed on wound care. Rec f/u Plastics. Pt instructed to return if any worsening symptoms or concerns.  They verbalize understanding.

## 2022-03-09 NOTE — ED PROVIDER NOTE - ATTENDING CONTRIBUTION TO CARE
22 yo F presents with injury to left nipple.  Pt states nipple ring was accidentally pulled out, On exam pt in NAD AAO x 3, + skin tear to left nipple, no bleeding, no FB

## 2022-03-18 ENCOUNTER — APPOINTMENT (OUTPATIENT)
Dept: PLASTIC SURGERY | Facility: CLINIC | Age: 22
End: 2022-03-18
Payer: MEDICAID

## 2022-03-18 VITALS — BODY MASS INDEX: 46.95 KG/M2 | WEIGHT: 275 LBS | HEIGHT: 64 IN

## 2022-03-18 DIAGNOSIS — Z78.9 OTHER SPECIFIED HEALTH STATUS: ICD-10-CM

## 2022-03-18 PROCEDURE — 99203 OFFICE O/P NEW LOW 30 MIN: CPT

## 2022-03-28 NOTE — PHYSICAL EXAM
[Bra Size: _______] : Bra Size: [unfilled] [de-identified] : well developed, overweight female, NAD [de-identified] : NC/AT [de-identified] : supple [de-identified] : good inspiratory effort, nonlabored breathing [de-identified] : LETTYR [de-identified] : Left breast - nipple with horizontal laceration on the medial aspect measuring 1 cm in length, essentially healed and epithelialized, no erythema, no drainage, nontender, unremarkable areola and remainder of left breast     \par Right breast unremarkable with nipple ring  [de-identified] : soft, nontender

## 2022-03-28 NOTE — HISTORY OF PRESENT ILLNESS
[FreeTextEntry1] : 20 yo otherwise healthy F with no significant PMHx who presents today for evaluation of left breast injury which occurred on 3/9/22. Patient was in the shower when her left nipple ring got caught on the shower door causing nipple trauma. Patient did not seek medical attention and did wound care with Neosporin.\par She presents today c/o unsatisfactory left nipple aesthetics and slight discomfort. Denies any significant pain, fever, chills, infection or drainage from the injury site. \par \par Occupation -  \par Social hx - smokes electronic cigarettes daily for couple of years

## 2022-04-10 ENCOUNTER — EMERGENCY (EMERGENCY)
Facility: HOSPITAL | Age: 22
LOS: 0 days | Discharge: HOME | End: 2022-04-10
Attending: EMERGENCY MEDICINE | Admitting: EMERGENCY MEDICINE
Payer: MEDICAID

## 2022-04-10 VITALS
DIASTOLIC BLOOD PRESSURE: 75 MMHG | OXYGEN SATURATION: 99 % | SYSTOLIC BLOOD PRESSURE: 104 MMHG | HEIGHT: 64 IN | RESPIRATION RATE: 18 BRPM | WEIGHT: 285.06 LBS | TEMPERATURE: 97 F | HEART RATE: 75 BPM

## 2022-04-10 DIAGNOSIS — Z87.42 PERSONAL HISTORY OF OTHER DISEASES OF THE FEMALE GENITAL TRACT: ICD-10-CM

## 2022-04-10 DIAGNOSIS — R09.81 NASAL CONGESTION: ICD-10-CM

## 2022-04-10 DIAGNOSIS — Z88.0 ALLERGY STATUS TO PENICILLIN: ICD-10-CM

## 2022-04-10 DIAGNOSIS — Z79.84 LONG TERM (CURRENT) USE OF ORAL HYPOGLYCEMIC DRUGS: ICD-10-CM

## 2022-04-10 DIAGNOSIS — J06.9 ACUTE UPPER RESPIRATORY INFECTION, UNSPECIFIED: ICD-10-CM

## 2022-04-10 PROCEDURE — 99283 EMERGENCY DEPT VISIT LOW MDM: CPT

## 2022-04-10 RX ORDER — CETIRIZINE HYDROCHLORIDE 10 MG/1
1 TABLET ORAL
Qty: 14 | Refills: 0
Start: 2022-04-10 | End: 2022-04-23

## 2022-04-10 RX ORDER — FLUTICASONE PROPIONATE 50 MCG
1 SPRAY, SUSPENSION NASAL
Qty: 30 | Refills: 0
Start: 2022-04-10 | End: 2022-05-09

## 2022-04-10 NOTE — ED PROVIDER NOTE - NS ED ATTENDING STATEMENT MOD
This was a shared visit with the HALLE. I reviewed and verified the documentation and independently performed the documented:

## 2022-04-10 NOTE — ED PROVIDER NOTE - NS ED ROS FT
Constitutional: (-) fever, (-) chills  Eyes: (-) visual changes  ENT: (+) nasal congestions  Cardiovascular: (-) chest pain, (-) syncope  Respiratory: (-) cough, (-) shortness of breath, (-) dyspnea,   Gastrointestinal: (-) vomiting, (-) diarrhea, (-)nausea,  Musculoskeletal: (-) neck pain, (-) back pain, (-) joint pain,  Integumentary: (-) rash, (-) edema, (-) bruises  Neurological: (-) headache, (-) loc, (-) dizziness, (-) tingling, (-)numbness,  Peripheral Vascular: (-) leg swelling  :  (-)dysuria,  (-) hematuria  Allergic/Immunologic: (-) pruritus

## 2022-04-10 NOTE — ED PROVIDER NOTE - OBJECTIVE STATEMENT
21-year-old female, past medical history of PCOS, presents ED for upper respiratory infection.  Nasal congestion x3 days no over-the-counter medications attempted.  Denies any specific pain or radiation.  Denies fever, chills, cough, shortness of breath, chest pain.

## 2022-04-10 NOTE — ED PROVIDER NOTE - PHYSICAL EXAMINATION
Physical Exam    Vital Signs: I have reviewed the initial vital signs.  Constitutional: well-nourished, appears stated age, no acute distress  Eyes: Conjunctiva pink, Sclera clear, PERRLA, EOMI.  ENT: OP is clear without exudates, normal dentition, normal gingival, tongue without swelling, TMs clear b/l, nasal turbinates erythematous w clear rhinorrhea, no lymphadenopathy.  Cardiovascular: S1 and S2, regular rate, regular rhythm, well-perfused extremities, radial pulses equal and 2+  Respiratory: unlabored respiratory effort, clear to auscultation bilaterally no wheezing, rales and rhonchi  Gastrointestinal: soft, non-tender abdomen, no pulsatile mass, normal bowl sounds  Musculoskeletal: supple neck, no lower extremity edema, no midline tenderness  Integumentary: warm, dry, no rash  Neurologic: awake, alert, nvi

## 2022-04-10 NOTE — ED PROVIDER NOTE - ATTENDING CONTRIBUTION TO CARE
I personally evaluated the patient. I reviewed the Resident´s or Physician Assistant´s note (as assigned above), and agree with the findings and plan except as documented in my note.  21-year-old female presents with ruuny nose and nasal congestion for 3 days.  No fever.  Exam shows alert patient in no distress, HEENT NCAT +rhinorhea, neck supple, throat no exudates, lungs clear, RR S1S2, abdomen soft NT +BS, no CCE, skin no rash.

## 2022-04-10 NOTE — ED PROVIDER NOTE - PATIENT PORTAL LINK FT
You can access the FollowMyHealth Patient Portal offered by [facility] by registering at the following website: [email or web address]. By joining [employer]’s FollowMyHealth portal, you will also be able to view your health information using other applications (apps) compatible with our system.

## 2022-05-02 ENCOUNTER — APPOINTMENT (OUTPATIENT)
Dept: PLASTIC SURGERY | Facility: CLINIC | Age: 22
End: 2022-05-02
Payer: MEDICAID

## 2022-05-02 PROCEDURE — 12031 INTMD RPR S/A/T/EXT 2.5 CM/<: CPT

## 2022-05-02 PROCEDURE — 11401 EXC TR-EXT B9+MARG 0.6-1 CM: CPT

## 2022-05-02 RX ORDER — DOXYCYCLINE HYCLATE 100 MG/1
100 TABLET ORAL
Qty: 6 | Refills: 0 | Status: ACTIVE | COMMUNITY
Start: 2022-05-02 | End: 1900-01-01

## 2022-05-02 NOTE — HISTORY OF PRESENT ILLNESS
[FreeTextEntry1] : 22 yo otherwise healthy F with no significant PMHx who presents today for evaluation of left breast injury which occurred on 3/9/22. Patient was in the shower when her left nipple ring got caught on the shower door causing nipple trauma. Patient did not seek medical attention and did wound care with Neosporin.\par She presents today c/o unsatisfactory left nipple aesthetics and slight discomfort. Denies any significant pain, fever, chills, infection or drainage from the injury site. \par \par Occupation -  \par Social hx - smokes electronic cigarettes daily for couple of years \par \par Interval hx (5/2/22):  Here for nipple laceration repair.  Denies fevers, chills, redness, dysuria.  Mother present.

## 2022-05-02 NOTE — PHYSICAL EXAM
[Bra Size: _______] : Bra Size: [unfilled] [de-identified] : well developed, overweight female, NAD [de-identified] : NC/AT [de-identified] : supple [de-identified] : good inspiratory effort, nonlabored breathing [de-identified] : LETTYR [de-identified] : Left breast - nipple with horizontal laceration on the medial aspect measuring 1 cm in length, essentially healed and epithelialized, no erythema, no drainage, nontender, unremarkable areola and remainder of left breast     \par Right breast unremarkable with nipple ring  [de-identified] : soft, nontender

## 2022-05-02 NOTE — ASSESSMENT
[FreeTextEntry1] : 20 yo otherwise healthy F with left nipple laceration s/p minor trauma now completely epithelialized. \par s/p scar revision and closure of left nipple scar\par \par - Pt tolerated the procedure\par - tylenol PRN\par - remove dressing in 2 days\par - may shower, remove dressing if gauze saturated\par - start bacitracin/bandage BID x 3 days then aquaphor\par - recommend against nipple repiercing at this time\par - all questions were answered\par - follow up in 2 weeks for scar check\par \par \par Due to COVID-19, pre-visit patient instructions were explained to the patient and their symptoms were checked upon arrival. Masks were used by the healthcare provider and staff and the examination room was cleaned after the patient visit concluded\par \par

## 2022-05-02 NOTE — PROCEDURE
[Nl] : None [FreeTextEntry1] : left nipple laceration [FreeTextEntry2] : left nipple laceration scar revision [FreeTextEntry6] : Benefits, risks and alternatives of the procedure were discussed. The risks include but not limited to bleeding, infection, poor wound healing and scarring, possible keloid, and need for re-operation. Location of scar and expected post-surgical outcomes were discussed. The patient understands the risks and would like to proceed with the office surgery.  Informed consent was obtained\par \par The skin lesion was marked and infiltrated with local anesthesia to effect.  The excision site was prepared and draped in sterile fashion.\par The scar was excised with the indicated length and repaired in the usual fashion. \par Surgical wounds were hemostatic:\par \par Site: left nipple laceration scar\par Excision of medial scar 8 mm\par Duct kept intact\par Closure complexity and length: intermediate (5-0 vicryl deep dermis, 5-0 chromic simple sutures)\par \par bacitacin/telfa/tegaderm

## 2022-05-19 ENCOUNTER — APPOINTMENT (OUTPATIENT)
Dept: PLASTIC SURGERY | Facility: CLINIC | Age: 22
End: 2022-05-19
Payer: MEDICAID

## 2022-05-19 DIAGNOSIS — S29.9XXA UNSPECIFIED INJURY OF THORAX, INITIAL ENCOUNTER: ICD-10-CM

## 2022-05-19 PROCEDURE — 99212 OFFICE O/P EST SF 10 MIN: CPT

## 2022-05-19 NOTE — ASSESSMENT
[FreeTextEntry1] : 20 yo otherwise healthy F with left nipple laceration s/p minor trauma now completely epithelialized. \par Now POD#17 s/p scar revision and closure of left nipple laceration. Doing well. Happy with results. \par \par - remaining suture removed \par - daily Aquaphor\par - recommend against nipple repiercing at this time\par - all questions were answered\par - follow up in 1 month with  for final photos and likely discharge \par \par Due to COVID-19, pre-visit patient instructions were explained to the patient and their symptoms were checked upon arrival. Masks were used by the healthcare provider and staff and the examination room was cleaned after the patient visit concluded\par \par

## 2022-05-19 NOTE — PHYSICAL EXAM
[Bra Size: _______] : Bra Size: [unfilled] [de-identified] : well developed, overweight female, NAD [de-identified] : good inspiratory effort, nonlabored breathing [de-identified] : LETTYR [de-identified] : Left breast - nipple incision healing well, c/d/i, no erythema, no drainage, nontender, excellent contour and cosmesis, unremarkable areola and remainder of left breast    \par Right breast unremarkable with nipple ring

## 2022-05-19 NOTE — HISTORY OF PRESENT ILLNESS
[FreeTextEntry1] : 22 yo otherwise healthy F with no significant PMHx who presents today for evaluation of left breast injury which occurred on 3/9/22. Patient was in the shower when her left nipple ring got caught on the shower door causing nipple trauma. Patient did not seek medical attention and did wound care with Neosporin.\par She presents today c/o unsatisfactory left nipple aesthetics and slight discomfort. Denies any significant pain, fever, chills, infection or drainage from the injury site. \par \par Occupation -  \par Social hx - smokes electronic cigarettes daily for couple of years \par \par Interval hx (5/2/22):  Here for nipple laceration repair.  Denies fevers, chills, redness, dysuria.  Mother present.\par \par Interval hx (5/19/22). Patient presents today POD#17 s/p repair of left nipple laceration. Doing well with no significant pain, f/c or drainage. Happy with results.

## 2022-07-01 ENCOUNTER — APPOINTMENT (OUTPATIENT)
Dept: PLASTIC SURGERY | Facility: CLINIC | Age: 22
End: 2022-07-01

## 2022-07-04 ENCOUNTER — NON-APPOINTMENT (OUTPATIENT)
Age: 22
End: 2022-07-04

## 2022-07-08 ENCOUNTER — EMERGENCY (EMERGENCY)
Facility: HOSPITAL | Age: 22
LOS: 0 days | Discharge: HOME | End: 2022-07-08
Attending: EMERGENCY MEDICINE | Admitting: EMERGENCY MEDICINE

## 2022-07-08 VITALS
OXYGEN SATURATION: 100 % | RESPIRATION RATE: 18 BRPM | DIASTOLIC BLOOD PRESSURE: 75 MMHG | SYSTOLIC BLOOD PRESSURE: 120 MMHG | TEMPERATURE: 98 F | HEART RATE: 90 BPM | HEIGHT: 64 IN | WEIGHT: 293 LBS

## 2022-07-08 DIAGNOSIS — J06.9 ACUTE UPPER RESPIRATORY INFECTION, UNSPECIFIED: ICD-10-CM

## 2022-07-08 DIAGNOSIS — F98.8 OTHER SPECIFIED BEHAVIORAL AND EMOTIONAL DISORDERS WITH ONSET USUALLY OCCURRING IN CHILDHOOD AND ADOLESCENCE: ICD-10-CM

## 2022-07-08 DIAGNOSIS — F32.A DEPRESSION, UNSPECIFIED: ICD-10-CM

## 2022-07-08 DIAGNOSIS — R05.9 COUGH, UNSPECIFIED: ICD-10-CM

## 2022-07-08 DIAGNOSIS — J02.9 ACUTE PHARYNGITIS, UNSPECIFIED: ICD-10-CM

## 2022-07-08 DIAGNOSIS — Z88.0 ALLERGY STATUS TO PENICILLIN: ICD-10-CM

## 2022-07-08 DIAGNOSIS — E11.9 TYPE 2 DIABETES MELLITUS WITHOUT COMPLICATIONS: ICD-10-CM

## 2022-07-08 DIAGNOSIS — E28.2 POLYCYSTIC OVARIAN SYNDROME: ICD-10-CM

## 2022-07-08 DIAGNOSIS — R11.10 VOMITING, UNSPECIFIED: ICD-10-CM

## 2022-07-08 DIAGNOSIS — Z79.84 LONG TERM (CURRENT) USE OF ORAL HYPOGLYCEMIC DRUGS: ICD-10-CM

## 2022-07-08 PROCEDURE — 99283 EMERGENCY DEPT VISIT LOW MDM: CPT

## 2022-07-08 RX ORDER — PHENYLEPHRINE HCL, BROMPHENIRAMINE MALEATE 5; 2 MG/10ML; MG/10ML
10 SOLUTION ORAL
Qty: 300 | Refills: 0
Start: 2022-07-08

## 2022-07-08 RX ORDER — DIPHENHYDRAMINE HCL 50 MG
50 CAPSULE ORAL ONCE
Refills: 0 | Status: COMPLETED | OUTPATIENT
Start: 2022-07-08 | End: 2022-07-08

## 2022-07-08 RX ORDER — DEXAMETHASONE 0.5 MG/5ML
12 ELIXIR ORAL ONCE
Refills: 0 | Status: COMPLETED | OUTPATIENT
Start: 2022-07-08 | End: 2022-07-08

## 2022-07-08 RX ADMIN — Medication 12 MILLIGRAM(S): at 06:08

## 2022-07-08 RX ADMIN — Medication 50 MILLIGRAM(S): at 06:08

## 2022-07-08 NOTE — ED PROVIDER NOTE - PATIENT PORTAL LINK FT
You can access the FollowMyHealth Patient Portal offered by [facility] by registering at the following website: [email or web address]. By joining [employer]’s FollowMyHealth portal, you will also be able to view your health information using other applications (apps) compatible with our system.

## 2022-07-08 NOTE — ED PROVIDER NOTE - CLINICAL SUMMARY MEDICAL DECISION MAKING FREE TEXT BOX
overall patient is well-appearing no respiratory distress symptoms likely secondary to URI improved with p.o. Decadron here I will discharge at this time

## 2022-07-08 NOTE — ED PROVIDER NOTE - ATTENDING APP SHARED VISIT CONTRIBUTION OF CARE
I was present for and supervised the key and critical aspects of the procedures performed during the care of the patient. Patient is a 21-year-old female presents for evaluation of increasing cough cold congestion over the past 1 week stating it was worse this several hours prior to arrival woke up and states that she had trouble breathing she came to the emergency department for evaluation denies any headache visual changes chest pain shortness of breath fevers chills diaphoresis abdominal pain no rashes noted yesterday    On physical exam patient is well-appearing appearing normocephalic/atraumatic pupils equal round react light accommodation extraocular muscles intact oropharynx clear chest clear to auscultation bilaterally abdomen soft nontender nondistended bowel sounds positive extremities full range of motion no rashes noted assessment plan overall patient is well-appearing no respiratory distress symptoms likely secondary to URI improved with p.o. Decadron here I will discharge at this time

## 2022-07-08 NOTE — ED PROVIDER NOTE - PHYSICAL EXAMINATION
CONSTITUTIONAL: Well-appearing; well-nourished; in no apparent distress.   EYES: PERRL; EOM intact.   ENT: + rhinorrhea; normal pharynx with no tonsillar hypertrophy.  TM normal bilaterally  NECK: Supple; non-tender; no cervical lymphadenopathy. No JVD.   CARDIOVASCULAR: Normal S1, S2; no murmurs, rubs, or gallops.   RESPIRATORY: Normal chest excursion with respiration; breath sounds clear and equal bilaterally; no wheezes, rhonchi, or rales.  GI/: Normal bowel sounds; non-distended; non-tender; no palpable organomegaly.   SKIN: Normal for age and race; warm; dry; good turgor; no apparent lesions or exudate.   NEURO/PSYCH: A & O x 4; grossly unremarkable.  Anxious mood

## 2022-07-08 NOTE — ED PROVIDER NOTE - NS ED ROS FT
Constitutional: no fever, chills, no recent weight loss, change in appetite or malaise  Eyes: no redness/discharge/pain/vision changes  ENT: See HPI  Cardiac: No chest pain, SOB or edema.  Respiratory: See HPI  GI: + Vomiting . no nausea, diarrhea or abdominal pain.  : No dysuria, frequency, urgency or hematuria  MS: no pain to back or extremities, no loss of ROM, no weakness  Neuro: No headache or weakness. No LOC.  Skin: No skin rash.  Endocrine: Metabolic syndrome.

## 2022-07-08 NOTE — ED PROVIDER NOTE - NSFOLLOWUPINSTRUCTIONS_ED_ALL_ED_FT
Upper Respiratory Infection, Adult  An upper respiratory infection (URI) is a common viral infection of the nose, throat, and upper air passages that lead to the lungs. The most common type of URI is the common cold. URIs usually get better on their own, without medical treatment.    What are the causes?  A URI is caused by a virus. You may catch a virus by:    Breathing in droplets from an infected person's cough or sneeze.  Touching something that has been exposed to the virus (contaminated) and then touching your mouth, nose, or eyes.    What increases the risk?  You are more likely to get a URI if:    You are very young or very old.  It is abram or winter.  You have close contact with others, such as at a , school, or health care facility.  You smoke.  You have long-term (chronic) heart or lung disease.  You have a weakened disease-fighting (immune) system.  You have nasal allergies or asthma.  You are experiencing a lot of stress.  You work in an area that has poor air circulation.  You have poor nutrition.    What are the signs or symptoms?  A URI usually involves some of the following symptoms:    Runny or stuffy (congested) nose.  Sneezing.  Cough.  Sore throat.  Headache.  Fatigue.  Fever.  Loss of appetite.  Pain in your forehead, behind your eyes, and over your cheekbones (sinus pain).  Muscle aches.  Redness or irritation of the eyes.  Pressure in the ears or face.    How is this diagnosed?  This condition may be diagnosed based on your medical history and symptoms, and a physical exam. Your health care provider may use a cotton swab to take a mucus sample from your nose (nasal swab). This sample can be tested to determine what virus is causing the illness.    How is this treated?  URIs usually get better on their own within 7–10 days. You can take steps at home to relieve your symptoms. Medicines cannot cure URIs, but your health care provider may recommend certain medicines to help relieve symptoms, such as:    Over-the-counter cold medicines.  Cough suppressants. Coughing is a type of defense against infection that helps to clear the respiratory system, so take these medicines only as recommended by your health care provider.  Fever-reducing medicines.    Follow these instructions at home:  Activity     Rest as needed.  If you have a fever, stay home from work or school until your fever is gone or until your health care provider says you are no longer contagious. Your health care provider may have you wear a face mask to prevent your infection from spreading.  Relieving symptoms     Gargle with a salt-water mixture 3–4 times a day or as needed. To make a salt-water mixture, completely dissolve ½–1 tsp of salt in 1 cup of warm water.  Use a cool-mist humidifier to add moisture to the air. This can help you breathe more easily.  Eating and drinking     Drink enough fluid to keep your urine pale yellow.  ImageEat soups and other clear broths.  General instructions     Take over-the-counter and prescription medicines only as told by your health care provider. These include cold medicines, fever reducers, and cough suppressants.  Do not use any products that contain nicotine or tobacco, such as cigarettes and e-cigarettes. If you need help quitting, ask your health care provider.   Stay away from secondhand smoke.  Stay up to date on all immunizations, including the yearly (annual) flu vaccine.  ImageKeep all follow-up visits as told by your health care provider. This is important.  How to prevent the spread of infection to others     ImageURIs can be passed from person to person (are contagious). To prevent the infection from spreading:    Wash your hands often with soap and water. If soap and water are not available, use hand .  Avoid touching your mouth, face, eyes, or nose.  Cough or sneeze into a tissue or your sleeve or elbow instead of into your hand or into the air.    Contact a health care provider if:  You are getting worse instead of better.  You have a fever or chills.  Your mucus is brown or red.  You have yellow or brown discharge coming from your nose.  You have pain in your face, especially when you bend forward.  You have swollen neck glands.  You have pain while swallowing.  You have white areas in the back of your throat.  Get help right away if:  You have shortness of breath that gets worse.  You have severe or persistent:    Headache.  Ear pain.  Sinus pain.  Chest pain.    You have chronic lung disease along with any of the following:    Wheezing.  Prolonged cough.  Coughing up blood.  A change in your usual mucus.    You have a stiff neck.  You have changes in your:    Vision.  Hearing.  Thinking.  Mood.    Summary  An upper respiratory infection (URI) is a common infection of the nose, throat, and upper air passages that lead to the lungs.  A URI is caused by a virus.  URIs usually get better on their own within 7–10 days.  Medicines cannot cure URIs, but your health care provider may recommend certain medicines to help relieve symptoms.

## 2022-07-17 ENCOUNTER — NON-APPOINTMENT (OUTPATIENT)
Age: 22
End: 2022-07-17

## 2022-07-18 ENCOUNTER — EMERGENCY (EMERGENCY)
Facility: HOSPITAL | Age: 22
LOS: 0 days | Discharge: HOME | End: 2022-07-18
Attending: STUDENT IN AN ORGANIZED HEALTH CARE EDUCATION/TRAINING PROGRAM | Admitting: STUDENT IN AN ORGANIZED HEALTH CARE EDUCATION/TRAINING PROGRAM

## 2022-07-18 VITALS
DIASTOLIC BLOOD PRESSURE: 57 MMHG | HEART RATE: 102 BPM | RESPIRATION RATE: 20 BRPM | HEIGHT: 64 IN | TEMPERATURE: 98 F | WEIGHT: 279.99 LBS | OXYGEN SATURATION: 96 % | SYSTOLIC BLOOD PRESSURE: 118 MMHG

## 2022-07-18 VITALS
DIASTOLIC BLOOD PRESSURE: 85 MMHG | SYSTOLIC BLOOD PRESSURE: 138 MMHG | RESPIRATION RATE: 20 BRPM | HEART RATE: 93 BPM | OXYGEN SATURATION: 99 %

## 2022-07-18 DIAGNOSIS — F98.8 OTHER SPECIFIED BEHAVIORAL AND EMOTIONAL DISORDERS WITH ONSET USUALLY OCCURRING IN CHILDHOOD AND ADOLESCENCE: ICD-10-CM

## 2022-07-18 DIAGNOSIS — R10.13 EPIGASTRIC PAIN: ICD-10-CM

## 2022-07-18 DIAGNOSIS — F41.9 ANXIETY DISORDER, UNSPECIFIED: ICD-10-CM

## 2022-07-18 DIAGNOSIS — K76.0 FATTY (CHANGE OF) LIVER, NOT ELSEWHERE CLASSIFIED: ICD-10-CM

## 2022-07-18 DIAGNOSIS — Z88.0 ALLERGY STATUS TO PENICILLIN: ICD-10-CM

## 2022-07-18 DIAGNOSIS — F32.A DEPRESSION, UNSPECIFIED: ICD-10-CM

## 2022-07-18 DIAGNOSIS — E11.9 TYPE 2 DIABETES MELLITUS WITHOUT COMPLICATIONS: ICD-10-CM

## 2022-07-18 DIAGNOSIS — R63.0 ANOREXIA: ICD-10-CM

## 2022-07-18 DIAGNOSIS — R11.0 NAUSEA: ICD-10-CM

## 2022-07-18 DIAGNOSIS — E28.2 POLYCYSTIC OVARIAN SYNDROME: ICD-10-CM

## 2022-07-18 LAB
ALBUMIN SERPL ELPH-MCNC: 4.6 G/DL — SIGNIFICANT CHANGE UP (ref 3.5–5.2)
ALP SERPL-CCNC: 70 U/L — SIGNIFICANT CHANGE UP (ref 30–115)
ALT FLD-CCNC: 32 U/L — SIGNIFICANT CHANGE UP (ref 14–37)
ANION GAP SERPL CALC-SCNC: 16 MMOL/L — HIGH (ref 7–14)
APPEARANCE UR: CLEAR — SIGNIFICANT CHANGE UP
AST SERPL-CCNC: 35 U/L — SIGNIFICANT CHANGE UP (ref 14–37)
BASOPHILS # BLD AUTO: 0.05 K/UL — SIGNIFICANT CHANGE UP (ref 0–0.2)
BASOPHILS NFR BLD AUTO: 0.3 % — SIGNIFICANT CHANGE UP (ref 0–1)
BILIRUB SERPL-MCNC: 0.4 MG/DL — SIGNIFICANT CHANGE UP (ref 0.2–1.2)
BILIRUB UR-MCNC: ABNORMAL
BUN SERPL-MCNC: 12 MG/DL — SIGNIFICANT CHANGE UP (ref 10–20)
CALCIUM SERPL-MCNC: 9.5 MG/DL — SIGNIFICANT CHANGE UP (ref 8.5–10.1)
CHLORIDE SERPL-SCNC: 102 MMOL/L — SIGNIFICANT CHANGE UP (ref 98–110)
CO2 SERPL-SCNC: 19 MMOL/L — SIGNIFICANT CHANGE UP (ref 17–32)
COLOR SPEC: YELLOW — SIGNIFICANT CHANGE UP
CREAT SERPL-MCNC: 0.9 MG/DL — SIGNIFICANT CHANGE UP (ref 0.7–1.5)
DIFF PNL FLD: NEGATIVE — SIGNIFICANT CHANGE UP
EGFR: 94 ML/MIN/1.73M2 — SIGNIFICANT CHANGE UP
EOSINOPHIL # BLD AUTO: 0.05 K/UL — SIGNIFICANT CHANGE UP (ref 0–0.7)
EOSINOPHIL NFR BLD AUTO: 0.3 % — SIGNIFICANT CHANGE UP (ref 0–8)
EPI CELLS # UR: ABNORMAL /HPF
GLUCOSE SERPL-MCNC: 103 MG/DL — HIGH (ref 70–99)
GLUCOSE UR QL: NEGATIVE MG/DL — SIGNIFICANT CHANGE UP
HCG SERPL QL: NEGATIVE — SIGNIFICANT CHANGE UP
HCT VFR BLD CALC: 39.9 % — SIGNIFICANT CHANGE UP (ref 37–47)
HGB BLD-MCNC: 13.1 G/DL — SIGNIFICANT CHANGE UP (ref 12–16)
IMM GRANULOCYTES NFR BLD AUTO: 0.3 % — SIGNIFICANT CHANGE UP (ref 0.1–0.3)
KETONES UR-MCNC: 15
LACTATE SERPL-SCNC: 1.2 MMOL/L — SIGNIFICANT CHANGE UP (ref 0.7–2)
LEUKOCYTE ESTERASE UR-ACNC: NEGATIVE — SIGNIFICANT CHANGE UP
LIDOCAIN IGE QN: 31 U/L — SIGNIFICANT CHANGE UP (ref 7–60)
LYMPHOCYTES # BLD AUTO: 28.4 % — SIGNIFICANT CHANGE UP (ref 20.5–51.1)
LYMPHOCYTES # BLD AUTO: 4.38 K/UL — HIGH (ref 1.2–3.4)
MCHC RBC-ENTMCNC: 26.2 PG — LOW (ref 27–31)
MCHC RBC-ENTMCNC: 32.8 G/DL — SIGNIFICANT CHANGE UP (ref 32–37)
MCV RBC AUTO: 79.8 FL — LOW (ref 81–99)
MONOCYTES # BLD AUTO: 1.35 K/UL — HIGH (ref 0.1–0.6)
MONOCYTES NFR BLD AUTO: 8.7 % — SIGNIFICANT CHANGE UP (ref 1.7–9.3)
NEUTROPHILS # BLD AUTO: 9.56 K/UL — HIGH (ref 1.4–6.5)
NEUTROPHILS NFR BLD AUTO: 62 % — SIGNIFICANT CHANGE UP (ref 42.2–75.2)
NITRITE UR-MCNC: NEGATIVE — SIGNIFICANT CHANGE UP
NRBC # BLD: 0 /100 WBCS — SIGNIFICANT CHANGE UP (ref 0–0)
PH UR: 6.5 — SIGNIFICANT CHANGE UP (ref 5–8)
PLATELET # BLD AUTO: 413 K/UL — HIGH (ref 130–400)
POTASSIUM SERPL-MCNC: 4.6 MMOL/L — SIGNIFICANT CHANGE UP (ref 3.5–5)
POTASSIUM SERPL-SCNC: 4.6 MMOL/L — SIGNIFICANT CHANGE UP (ref 3.5–5)
PROT SERPL-MCNC: 8.3 G/DL — HIGH (ref 6–8)
PROT UR-MCNC: 30 MG/DL
RBC # BLD: 5 M/UL — SIGNIFICANT CHANGE UP (ref 4.2–5.4)
RBC # FLD: 13.1 % — SIGNIFICANT CHANGE UP (ref 11.5–14.5)
RBC CASTS # UR COMP ASSIST: NEGATIVE — SIGNIFICANT CHANGE UP
SODIUM SERPL-SCNC: 137 MMOL/L — SIGNIFICANT CHANGE UP (ref 135–146)
SP GR SPEC: >=1.03 (ref 1.01–1.03)
UROBILINOGEN FLD QL: 0.2 MG/DL — SIGNIFICANT CHANGE UP
WBC # BLD: 15.44 K/UL — HIGH (ref 4.8–10.8)
WBC # FLD AUTO: 15.44 K/UL — HIGH (ref 4.8–10.8)
WBC UR QL: NEGATIVE — SIGNIFICANT CHANGE UP

## 2022-07-18 PROCEDURE — 74177 CT ABD & PELVIS W/CONTRAST: CPT | Mod: 26,MA

## 2022-07-18 PROCEDURE — 99285 EMERGENCY DEPT VISIT HI MDM: CPT

## 2022-07-18 PROCEDURE — 71045 X-RAY EXAM CHEST 1 VIEW: CPT | Mod: 26

## 2022-07-18 RX ORDER — FAMOTIDINE 10 MG/ML
20 INJECTION INTRAVENOUS ONCE
Refills: 0 | Status: COMPLETED | OUTPATIENT
Start: 2022-07-18 | End: 2022-07-18

## 2022-07-18 RX ORDER — METFORMIN HYDROCHLORIDE 850 MG/1
1 TABLET ORAL
Qty: 0 | Refills: 0 | DISCHARGE

## 2022-07-18 RX ORDER — SODIUM CHLORIDE 9 MG/ML
1000 INJECTION INTRAMUSCULAR; INTRAVENOUS; SUBCUTANEOUS ONCE
Refills: 0 | Status: COMPLETED | OUTPATIENT
Start: 2022-07-18 | End: 2022-07-18

## 2022-07-18 RX ORDER — ONDANSETRON 8 MG/1
4 TABLET, FILM COATED ORAL ONCE
Refills: 0 | Status: COMPLETED | OUTPATIENT
Start: 2022-07-18 | End: 2022-07-18

## 2022-07-18 RX ADMIN — FAMOTIDINE 20 MILLIGRAM(S): 10 INJECTION INTRAVENOUS at 17:28

## 2022-07-18 RX ADMIN — SODIUM CHLORIDE 1000 MILLILITER(S): 9 INJECTION INTRAMUSCULAR; INTRAVENOUS; SUBCUTANEOUS at 17:28

## 2022-07-18 RX ADMIN — ONDANSETRON 4 MILLIGRAM(S): 8 TABLET, FILM COATED ORAL at 17:28

## 2022-07-18 NOTE — ED PROVIDER NOTE - CARE PROVIDER_API CALL
Sally Lehman)  Internal Medicine  [address] [place] [address]  Phone: [phone]  Fax: [phone]  Follow Up Time: Routine    Vidal Traylor)  Gastroenterology; Internal Medicine  [address] [place] [address]  Phone: [phone]  Fax: [phone]  Follow Up Time: Routine

## 2022-07-18 NOTE — ED PROVIDER NOTE - WET READ LAUNCH FT
The patient is at high risk for a choroidal neovascular membrane. NO CNV SEEN TODAY. Dry ARMD is responsible for some decrease in vision. There is 1 Wet Read(s) to document. There are no Wet Read(s) to document.

## 2022-07-18 NOTE — ED PROVIDER NOTE - PROVIDER TOKENS
PROVIDER:[TOKEN:[[id number]],FOLLOWUP:[Routine]],PROVIDER:[TOKEN:[[id number]],FOLLOWUP:[Routine]]

## 2022-07-18 NOTE — ED PROVIDER NOTE - NS ED ROS FT
Constitutional: (-) fever (-) malaise (-) diaphoresis (-) chills (-) wt. loss (-) bodyaches (-) night sweats  Eyes: (-) visual changes (-) eye pain (-) eye discharge (-) photophobia (-) FB sensation  ENMT: (-) nasal or chest congestion (-) runny nose (-) sore throat (-) hoarseness  (-) hearing changes (-) ear pain (-) ear discharge or infections (-) neck pain (-) neck stiffness  Cardiac: (-) chest pain  (-) palpitations (-) syncope (-) edema  Respiratory: (-) cough (-) SOB (-) SIMMONS  GI: (+) nausea (-) vomiting (-) diarrhea (+) abdominal pain   : (-) dysuria (-) increased frequency  (-) hematuria (-) incontinence  MS: (-) back pain (-) myalgia (-) muscle weakness (-)  joint pain  Neuro: (-) headache (-) dizziness (-) numbness/tingling to extremities B/L   Skin: (-) rash (-) laceration    Except as documented in the HPI, all other systems are negative.

## 2022-07-18 NOTE — ED ADULT NURSE NOTE - NSICDXPASTMEDICALHX_GEN_ALL_CORE_FT
PAST MEDICAL HISTORY:  Attention deficit disorder (ADD)     Depression     Diabetes mellitus, type 2 Patient denies    Polycystic ovary

## 2022-07-18 NOTE — ED PROVIDER NOTE - PATIENT PORTAL LINK FT
You can access the FollowMyHealth Patient Portal offered by [facility] by registering at the following website: [email or web address]. By joining [facility]’s FollowMyHealth portal, you will also be able to view your health information using other applications (apps) compatible with our system.

## 2022-07-18 NOTE — ED PROVIDER NOTE - ATTENDING APP SHARED VISIT CONTRIBUTION OF CARE
21-year-old female past medical history PCOS, diabetes, ADD, fatty liver who presents to the ER for intermittent diffuse abdominal pain radiating to her right flank.  She reported that earlier it was difficult to get into a comfortable position.  She had an ultrasound done with her OB/GYN 3 weeks ago and was told she had "stones".  She went to urgent care today, with the patient and her family told her to come to the ER for a CAT scan.  She denies fever, vomiting, urinary symptoms.    Vitals noted, triage note reviewed    Gen - NAD, Head - NCAT, Pharynx - clear, MMM, Heart - RRR, no m/g/r, Lungs - CTAB, no w/c/r, Back- b/l CVA ttp, Abdomen - soft, NT, ND, Skin - No rash, Extremities - FROM, no edema, erythema, ecchymosis, brisk cap refill, Neuro - A&O x3, equal strength and sensation, non-focal exam    a/p:  diffuse abd pain radiating to R flank  r/o kidney stones, less likely enteritis, RLL PNA  labs, ct a/p, cxr  ivf, pepcid, zofran  reassess

## 2022-07-18 NOTE — ED PROVIDER NOTE - CLINICAL SUMMARY MEDICAL DECISION MAKING FREE TEXT BOX
21-year-old female past medical history PCOS, diabetes, ADD, fatty liver who presents to the ER for intermittent diffuse abdominal pain radiating to her right flank. Labs and imaging reviewed. CT w/o evidence of acute pathology. ON reassessment pt well appearing, I have fully discussed the medical management and delivery of care with the patient. I have discussed any available labs, imaging and treatment options with the patient. All Questions answered at the bedside and printed copies of all results provided and recommended to review with PCP. Patient confirms understanding and has been given detailed return precautions. Patient instructed to return to the ED should symptoms persist or worsen. Patient has demonstrated capacity and has verbalized understanding. Patient is well appearing upon discharge, ambulatory with a steady gait.

## 2022-07-18 NOTE — ED PROVIDER NOTE - NSFOLLOWUPINSTRUCTIONS_ED_ALL_ED_FT
You were examined for abdominal pain and found to have no serious cause of the abdominal pain at this time.   Please increase your diet as tolerated. Please increase the activity as tolerated. Please take all your medications as prescribed. Please follow up with the appropriate doctors.     It is important that you carefully watch for changes in the abdominal pain that might suggest a serious condition. See your doctor or return to the emergency department immediately if your condition gets worse. You can also call us if you are not sure what to do.  See your doctor tomorrow or as soon as possible if you are not getting better.  These symptoms suggest serious causes of abdominal pain. Call your doctor or return to the emergency  department if you are feeling worse or if:  1. You are unable to walk easily or are walking in a bent-over position.  2. Stepping or jumping results in severe pain.  3. You are experiencing pain in the right lower part of the abdomen.  4. The abdomen is hard and painful when you press on it.  5. There is severe abdominal pain when coughing.  6. You are vomiting or gagging.  7. Vomiting is bloody or green or looks like chocolate or coffee.  8. The belly looks very full or big.  9. You are experiencing severe pain every 3 to 20 minutes.  10. Stool (poop) is bloody or black.  11. You are drowsy, weak, fussy, or pale

## 2022-07-18 NOTE — ED PROVIDER NOTE - OBJECTIVE STATEMENT
21-year-old female past medical history diabetes, depression, anxiety presenting to the ED for evaluation of abdominal pain intermittently x3 days radiating to her right flank.  Patient reporting intermittent epigastric pain with intermittent nausea x3 days.  Patient went to urgent care for evaluation and was sent to the ED for CT scan.  Patient also endorsing poor appetite, denies any modifying factors for her symptoms.  Denies fever, chills, vomiting, diarrhea, chest pain, shortness of breath, flank pain, dysuria, hematuria.

## 2022-07-18 NOTE — ED PROVIDER NOTE - CARE PROVIDERS DIRECT ADDRESSES
,DirectAddress_Unknown,[email or web address]

## 2022-07-18 NOTE — ED PROVIDER NOTE - NS ED ATTENDING STATEMENT MOD
Patient arrived per mom, patient has been drinking tonight \"about 3 pints of vodka\"  Patient also states \"I just Arvis Roof go home and die, ya know? I just want to stop my life. I'm tired of it\"  Patient states that he took multiple blood pressure medications \"a few days ago\" in an attempt to kill himself.   Patient denies taking pills tonight This was a shared visit with the HALLE. I reviewed and verified the documentation and independently performed the documented:

## 2022-07-20 LAB
CULTURE RESULTS: SIGNIFICANT CHANGE UP
SPECIMEN SOURCE: SIGNIFICANT CHANGE UP

## 2022-08-03 ENCOUNTER — APPOINTMENT (OUTPATIENT)
Dept: OTOLARYNGOLOGY | Facility: CLINIC | Age: 22
End: 2022-08-03

## 2022-08-04 ENCOUNTER — APPOINTMENT (OUTPATIENT)
Dept: OTOLARYNGOLOGY | Facility: CLINIC | Age: 22
End: 2022-08-04

## 2022-08-12 PROBLEM — E11.9 TYPE 2 DIABETES MELLITUS WITHOUT COMPLICATIONS: Chronic | Status: ACTIVE | Noted: 2018-09-26

## 2022-10-03 ENCOUNTER — APPOINTMENT (OUTPATIENT)
Dept: GASTROENTEROLOGY | Facility: CLINIC | Age: 22
End: 2022-10-03

## 2022-10-03 VITALS — WEIGHT: 266.4 LBS | BODY MASS INDEX: 45.48 KG/M2 | HEIGHT: 64 IN

## 2022-10-03 DIAGNOSIS — R10.10 UPPER ABDOMINAL PAIN, UNSPECIFIED: ICD-10-CM

## 2022-10-03 DIAGNOSIS — Z86.59 PERSONAL HISTORY OF OTHER MENTAL AND BEHAVIORAL DISORDERS: ICD-10-CM

## 2022-10-03 DIAGNOSIS — K62.5 HEMORRHAGE OF ANUS AND RECTUM: ICD-10-CM

## 2022-10-03 DIAGNOSIS — R11.0 NAUSEA: ICD-10-CM

## 2022-10-03 DIAGNOSIS — R10.13 EPIGASTRIC PAIN: ICD-10-CM

## 2022-10-03 DIAGNOSIS — R10.30 LOWER ABDOMINAL PAIN, UNSPECIFIED: ICD-10-CM

## 2022-10-03 PROCEDURE — 99204 OFFICE O/P NEW MOD 45 MIN: CPT

## 2022-10-03 RX ORDER — POLYETHYLENE GLYCOL 3350 AND ELECTROLYTES WITH LEMON FLAVOR 236; 22.74; 6.74; 5.86; 2.97 G/4L; G/4L; G/4L; G/4L; G/4L
236 POWDER, FOR SOLUTION ORAL
Qty: 1 | Refills: 0 | Status: ACTIVE | COMMUNITY
Start: 2022-10-03 | End: 1900-01-01

## 2022-10-03 NOTE — REVIEW OF SYSTEMS
[As Noted in HPI] : as noted in HPI [Abdominal Pain] : abdominal pain [Negative] : Musculoskeletal [Fever] : no fever [Chills] : no chills [Recent Weight Loss (___ Lbs)] : no recent weight loss [Chest Pain] : no chest pain [Palpitations] : no palpitations [Constipation] : no constipation [Diarrhea] : no diarrhea

## 2022-10-03 NOTE — HISTORY OF PRESENT ILLNESS
[FreeTextEntry1] : 22yo female  h/o anxiety/depression presents for evaluation of abdominal pain and rectal bleeding\par \par Pt reports developing RUQ/abdominal pain prompting ED evaluation in 7/2022, had CT abd/pelvis which was unremarkable, advised outpt follow up. Pt had ultrasound imaging performed revealing 1.3cm gallstone and hepatic steatosis.\par \par Pt presents with grandmother for visit, she reports intermittent rectal bleeding a few months ago described as bright and dark blood mostly on toilet paper when wiping, denies recent episodes. Reports regular brown bm daily, denies hard stools or straining. Reports intermittent abdominal pain appears to be migrating, mid left and right side, also lower abdomen, no relation to bms. She notes worsening with foods, no obvious trigger or association with fatty meals. Reports early satiety and nausea with occasional vomiting. Denies heartburn or dysphagia. \par Appetite ok, denies weight loss. Not taking any medications currently.\par \par Reports prior EGD 1 year ago, unclear of findings\par No prior colonoscopy\par No known family h/o colon ca\par \par \par Labs reviewed (7/2022):\par Hb 13.1\par lipase normal\par ast/alt normal\par

## 2022-10-03 NOTE — ASSESSMENT
[FreeTextEntry1] : 22yo female h/o anxiety/depression presents for evaluation of intermittent abdominal pain, early satiety and rectal bleeding s/p CT abd/pelvis which was unremarkable and ultrasound imaging performed revealing gallstones and hepatic steatosis. Suspect functional component with possible hemorrhoidal bleeding, symptoms not consistent with IBS or biliary colic at this time however need to exclude other etiologies. No anemia. LFTs normal. \par \par -Recommend EGD and colonoscopy to further evaluate\par -Discussed risks/benefits in detail with pt and pts grandmother, she is agreeable to proceed\par -Encouraged adequate hydration and fiber intake\par -Will also refer to surgery for evaluation of gallstones\par -Discussed avoidance of fatty, greasy foods\par -Discussed lifestyle/dietary modifications\par \par Follow up after testing\par Pt/pts grandmother agreeable with plan, advised to contact us if questions/concerns

## 2022-10-03 NOTE — PHYSICAL EXAM
[Alert] : alert [Well Developed] : well developed [Well Nourished] : well nourished [Normal] : heart rate was normal and rhythm regular, normal S1 and S2, no murmurs [No Masses] : no abdominal mass palpated [Abdomen Soft] : soft [Patient Refused] : rectal exam was refused by the patient [de-identified] : abd soft, mild diffuse tenderness on palpation, no focal RUQ pain elicited

## 2022-11-08 ENCOUNTER — RESULT REVIEW (OUTPATIENT)
Age: 22
End: 2022-11-08

## 2022-11-08 ENCOUNTER — TRANSCRIPTION ENCOUNTER (OUTPATIENT)
Age: 22
End: 2022-11-08

## 2022-11-08 ENCOUNTER — OUTPATIENT (OUTPATIENT)
Dept: OUTPATIENT SERVICES | Facility: HOSPITAL | Age: 22
LOS: 1 days | Discharge: HOME | End: 2022-11-08

## 2022-11-08 VITALS
WEIGHT: 279.99 LBS | HEIGHT: 64 IN | TEMPERATURE: 97 F | SYSTOLIC BLOOD PRESSURE: 118 MMHG | HEART RATE: 76 BPM | RESPIRATION RATE: 18 BRPM | DIASTOLIC BLOOD PRESSURE: 69 MMHG

## 2022-11-08 VITALS
SYSTOLIC BLOOD PRESSURE: 121 MMHG | DIASTOLIC BLOOD PRESSURE: 59 MMHG | OXYGEN SATURATION: 97 % | RESPIRATION RATE: 20 BRPM | HEART RATE: 60 BPM

## 2022-11-08 DIAGNOSIS — Z90.89 ACQUIRED ABSENCE OF OTHER ORGANS: Chronic | ICD-10-CM

## 2022-11-08 PROCEDURE — 43239 EGD BIOPSY SINGLE/MULTIPLE: CPT

## 2022-11-08 PROCEDURE — 88312 SPECIAL STAINS GROUP 1: CPT | Mod: 26

## 2022-11-08 PROCEDURE — 88305 TISSUE EXAM BY PATHOLOGIST: CPT | Mod: 26

## 2022-11-08 PROCEDURE — 45378 DIAGNOSTIC COLONOSCOPY: CPT

## 2022-11-08 NOTE — ASU DISCHARGE PLAN (ADULT/PEDIATRIC) - NS MD DC FALL RISK RISK
For information on Fall & Injury Prevention, visit: [email or web address] OR  [email or web address] OR  https://www.cdc.gov/steadi/patient.html

## 2022-11-08 NOTE — CHART NOTE - NSCHARTNOTEFT_GEN_A_CORE
PACU ANESTHESIA ADMISSION NOTE      Procedure: EGD  Post op diagnosis:  Ulcer    ____  Intubated  TV:______       Rate: ______      FiO2: ______    __x__  Patent Airway    __x__  Full return of protective reflexes    __x__  Full recovery from anesthesia / back to baseline status    Vitals:  T(C): 36.2 (11-08-22 @ 10:27), Max: 36.2 (11-08-22 @ 08:37)  HR: 100 (11-08-22 @ 10:37) (76 - 100)  BP: 150/100 (11-08-22 @ 10:37) (117/60 - 150/100)  RR: 22 (11-08-22 @ 10:37) (18 - 22)  SpO2: 96% (11-08-22 @ 10:37) (95% - 96%)    Mental Status:  __x__ Awake   ___x__ Alert   _____ Drowsy   _____ Sedated    Nausea/Vomiting:  __x__ NO  ______Yes,   See Post - Op Orders          Pain Scale (0-10):  _____    Treatment: ____ None    __x__ See Post - Op/PCA Orders    Post - Operative Fluids:   ____ Oral   __x__ See Post - Op Orders    Plan: Discharge:   ___x_Home       _____Floor     _____Critical Care    _____  Other:_________________    Comments: Patient had smooth intraoperative event, no anesthesia complication.  PACU Vital signs: HR:      94      BP:   125     /  76        RR:     16        O2 Sat: 99       %     Temp 97.1

## 2022-11-08 NOTE — H&P PST ADULT - HISTORY OF PRESENT ILLNESS
20yo female h/o anxiety/depression presents for EGD and colonoscopy for evaluation of abdominal pain and rectal bleeding     Reports prior EGD 1 year ago, unclear of findings  No prior colonoscopy  No known family h/o colon ca

## 2022-11-08 NOTE — ASU DISCHARGE PLAN (ADULT/PEDIATRIC) - CARE PROVIDER_API CALL
Lauren Stark)  Gastroenterology; Internal Medicine  [address] [place] [address]  Phone: [phone]  Fax: [phone]  Follow Up Time: 2 weeks

## 2022-11-09 LAB
SURGICAL PATHOLOGY STUDY: SIGNIFICANT CHANGE UP
SURGICAL PATHOLOGY STUDY: SIGNIFICANT CHANGE UP

## 2022-11-11 ENCOUNTER — APPOINTMENT (OUTPATIENT)
Dept: SURGERY | Facility: CLINIC | Age: 22
End: 2022-11-11

## 2022-11-11 VITALS
WEIGHT: 266 LBS | HEART RATE: 95 BPM | SYSTOLIC BLOOD PRESSURE: 108 MMHG | TEMPERATURE: 97 F | HEIGHT: 64 IN | BODY MASS INDEX: 45.41 KG/M2 | DIASTOLIC BLOOD PRESSURE: 74 MMHG | OXYGEN SATURATION: 98 %

## 2022-11-11 DIAGNOSIS — R10.9 UNSPECIFIED ABDOMINAL PAIN: ICD-10-CM

## 2022-11-11 DIAGNOSIS — F32.A DEPRESSION, UNSPECIFIED: ICD-10-CM

## 2022-11-11 DIAGNOSIS — Z88.0 ALLERGY STATUS TO PENICILLIN: ICD-10-CM

## 2022-11-11 DIAGNOSIS — F17.290 NICOTINE DEPENDENCE, OTHER TOBACCO PRODUCT, UNCOMPLICATED: ICD-10-CM

## 2022-11-11 DIAGNOSIS — E11.9 TYPE 2 DIABETES MELLITUS WITHOUT COMPLICATIONS: ICD-10-CM

## 2022-11-11 DIAGNOSIS — F98.8 OTHER SPECIFIED BEHAVIORAL AND EMOTIONAL DISORDERS WITH ONSET USUALLY OCCURRING IN CHILDHOOD AND ADOLESCENCE: ICD-10-CM

## 2022-11-11 DIAGNOSIS — K29.50 UNSPECIFIED CHRONIC GASTRITIS WITHOUT BLEEDING: ICD-10-CM

## 2022-11-11 DIAGNOSIS — F41.9 ANXIETY DISORDER, UNSPECIFIED: ICD-10-CM

## 2022-11-11 DIAGNOSIS — K80.20 CALCULUS OF GALLBLADDER W/OUT CHOLECYSTITIS W/OUT OBSTRUCTION: ICD-10-CM

## 2022-11-11 DIAGNOSIS — K64.8 OTHER HEMORRHOIDS: ICD-10-CM

## 2022-11-11 DIAGNOSIS — K92.1 MELENA: ICD-10-CM

## 2022-11-11 PROCEDURE — 99204 OFFICE O/P NEW MOD 45 MIN: CPT

## 2022-11-11 RX ORDER — OMEPRAZOLE 40 MG/1
40 CAPSULE, DELAYED RELEASE ORAL TWICE DAILY
Qty: 30 | Refills: 2 | Status: ACTIVE | COMMUNITY
Start: 2022-11-11 | End: 1900-01-01

## 2022-11-11 NOTE — ASSESSMENT
[FreeTextEntry1] : Ms. MOOKIE SHAVER is a 21 year  year old woman. She presents to the office on 11/11/2022 , her primary is SB ALEXANDER .\par She has been having abdominal pain for many years.\par She came to me with her grandmother. \par Grandmother states " that the gallbladder needs to come out " . She has too much pain and goes to the ED every week and is difficult for the family . \par The patients states to have the gallbladder out so that people stop talking about. \par Her pain has been there for many years. It is not related to food or movement. \par It is diffuse and has no specific characteristics. \par She has not lost or gained weigh recently \par She has not noticed any change in bladder or bowel habits.\par She does not have a psychiatrist now. - She states she only wants meds and does not want to talk to them . \par She went to a primary - just to get insurance and now hasn’t seen her. \par She is not working now. \par \par US at  shows gallstone with normal cbd\par egd - erosive gastritis\par colonoscopy - internal hemmrhoids. \par \par \par impression : asymptomatic gallstones\par \par gi symptoms likely from untreated psychiatric issues\par PPI sent \par called psychiatry - awaiting appointment. \par \par \par case discussed with gi team and primary team. \par will need to talk to a psychiatrist before scheduleling lap manolo\par after talkgin to psychitry and pmd - will schedule for lap/robo manolo\par \par \par We explained in great detail the pathophysiology of the disease process. We discussed the workup for diagnosis and management. The Post operative care was explained to the patient. He was counselled on diet , exercise and wound care. The Procedure was explained to the patient. The Risk , benefit and alternatives were discussed. We discussed recovery and possible complications. We discussed complications including sever complications like injury of the surrounding organs like the bile duct. We discussed about the benefits and disadvantage of converting the laparoscopic surgery to open. We also discussed about post cholecystectomy syndrome and  symptom management after surgery.\par \par We discussed the importance of close follow up. \par We informed that she needs to follow up in office\par We also informed that she can call us if anything changes or has any questions.\par

## 2022-11-11 NOTE — PHYSICAL EXAM
[Purpura] : no purpura  [Alert] : alert [Calm] : calm [de-identified] : Normal  [de-identified] : Normal  [de-identified] : Normal \par b/l floating rib tenderness\par left cva tenderness [de-identified] : Normal

## 2022-11-18 ENCOUNTER — NON-APPOINTMENT (OUTPATIENT)
Age: 22
End: 2022-11-18

## 2022-12-02 ENCOUNTER — APPOINTMENT (OUTPATIENT)
Dept: GASTROENTEROLOGY | Facility: CLINIC | Age: 22
End: 2022-12-02

## 2022-12-16 ENCOUNTER — APPOINTMENT (OUTPATIENT)
Dept: SURGERY | Facility: CLINIC | Age: 22
End: 2022-12-16

## 2022-12-16 VITALS
BODY MASS INDEX: 44.05 KG/M2 | OXYGEN SATURATION: 98 % | DIASTOLIC BLOOD PRESSURE: 64 MMHG | HEART RATE: 86 BPM | TEMPERATURE: 97.5 F | WEIGHT: 258 LBS | SYSTOLIC BLOOD PRESSURE: 108 MMHG | HEIGHT: 64 IN

## 2022-12-16 PROCEDURE — 99213 OFFICE O/P EST LOW 20 MIN: CPT

## 2022-12-16 NOTE — ED PEDIATRIC NURSE NOTE - NS ED NURSE RECORD ANOTHER HT AND WT
SHRUTI - 20w5d   Heartburn worse and  Making N/V worse some days - was on protonix before, will rx      36year old , [facility] by LMP c/w 7wk US  -NIPT neg, pt having gender reveal ~20wk  -flu shot done  -AFP ordered, pending  -Anatomy US with multiple suboptimal views -- referred for L2US    1) Hx preeclampsia with severe features in prior pregnancy  -induced at 35wk.  Had elevated LFTs per outside records so concern for possible HELLP syndrome  -on ASA  -baseline preE labs normal    2) UTI in pregnancy  -culture with 50K growth , treated with macrobid  -check test of cure next visit    3) Celiac disease  -controlled with diet, probiotic Yes

## 2022-12-22 NOTE — HISTORY OF PRESENT ILLNESS
[de-identified] : 22 yo female presents with her grandmother for possible cholecystectomy. Had seen Dr. Damico before for vague abdominal pain. Had gotten an EGD/Colonoscopy without obvious source of the pain. Grandmother asking persistently to just have the GB removed. Patient reports her pain is her lower quadrants and radiates all over. No alleviating or exacerbating symptoms. \par \par Per chart review:\par US at [facility] shows gallstone with normal cbd\par egd - erosive gastritis\par colonoscopy - internal hemmrhoids.

## 2022-12-22 NOTE — ASSESSMENT
[FreeTextEntry1] : 22 yo female with likely asymptomatic gallstones who had been seen by my partner Dr. Damico for possible lap/robo manolo presents with her grandmother for cholecystectomy\par \par Grandmother seems to be pushing for the patient to have her GB out because the patient keeps complaining of nonspecific abdominal pain resulting in ED trips.\par \par case d/w with Dr. Damico who spoke with the PMD; believe there is an undertreated mental illness; I agree with Dr. Damico's assessment that her vague abdominal symptoms might not be from her gallstone\par \par f/u with psych as previously recommended\par can return after psych appt to Dr. Damico or myself for lap manolo

## 2022-12-22 NOTE — PHYSICAL EXAM
[de-identified] : NAD [de-identified] : No obvious JVD, trachea midline  [de-identified] : Equal chest rise bilaterally, nonlabored breaths  [de-identified] : s1s2

## 2023-04-29 ENCOUNTER — EMERGENCY (EMERGENCY)
Facility: HOSPITAL | Age: 23
LOS: 0 days | Discharge: HOME | End: 2023-04-29
Attending: EMERGENCY MEDICINE
Payer: MEDICAID

## 2023-04-29 VITALS
OXYGEN SATURATION: 98 % | HEART RATE: 84 BPM | DIASTOLIC BLOOD PRESSURE: 56 MMHG | TEMPERATURE: 97 F | HEIGHT: 63 IN | SYSTOLIC BLOOD PRESSURE: 116 MMHG | RESPIRATION RATE: 16 BRPM | WEIGHT: 279.99 LBS

## 2023-04-29 DIAGNOSIS — Z88.0 ALLERGY STATUS TO PENICILLIN: ICD-10-CM

## 2023-04-29 DIAGNOSIS — R10.30 LOWER ABDOMINAL PAIN, UNSPECIFIED: ICD-10-CM

## 2023-04-29 DIAGNOSIS — M54.9 DORSALGIA, UNSPECIFIED: ICD-10-CM

## 2023-04-29 DIAGNOSIS — Z90.89 ACQUIRED ABSENCE OF OTHER ORGANS: Chronic | ICD-10-CM

## 2023-04-29 LAB
ALBUMIN SERPL ELPH-MCNC: 4.1 G/DL — SIGNIFICANT CHANGE UP (ref 3.5–5.2)
ALP SERPL-CCNC: 63 U/L — SIGNIFICANT CHANGE UP (ref 30–115)
ALT FLD-CCNC: 10 U/L — SIGNIFICANT CHANGE UP (ref 0–41)
ANION GAP SERPL CALC-SCNC: 12 MMOL/L — SIGNIFICANT CHANGE UP (ref 7–14)
APPEARANCE UR: CLEAR — SIGNIFICANT CHANGE UP
AST SERPL-CCNC: 21 U/L — SIGNIFICANT CHANGE UP (ref 0–41)
BACTERIA # UR AUTO: ABNORMAL /HPF
BASOPHILS # BLD AUTO: 0.05 K/UL — SIGNIFICANT CHANGE UP (ref 0–0.2)
BASOPHILS NFR BLD AUTO: 0.5 % — SIGNIFICANT CHANGE UP (ref 0–1)
BILIRUB DIRECT SERPL-MCNC: <0.2 MG/DL — SIGNIFICANT CHANGE UP (ref 0–0.3)
BILIRUB INDIRECT FLD-MCNC: >0.2 MG/DL — SIGNIFICANT CHANGE UP (ref 0.2–1.2)
BILIRUB SERPL-MCNC: 0.4 MG/DL — SIGNIFICANT CHANGE UP (ref 0.2–1.2)
BILIRUB UR-MCNC: NEGATIVE — SIGNIFICANT CHANGE UP
BUN SERPL-MCNC: 13 MG/DL — SIGNIFICANT CHANGE UP (ref 10–20)
CALCIUM SERPL-MCNC: 9.1 MG/DL — SIGNIFICANT CHANGE UP (ref 8.4–10.5)
CHLORIDE SERPL-SCNC: 106 MMOL/L — SIGNIFICANT CHANGE UP (ref 98–110)
CO2 SERPL-SCNC: 22 MMOL/L — SIGNIFICANT CHANGE UP (ref 17–32)
COLOR SPEC: YELLOW — SIGNIFICANT CHANGE UP
CREAT SERPL-MCNC: 0.7 MG/DL — SIGNIFICANT CHANGE UP (ref 0.7–1.5)
DIFF PNL FLD: ABNORMAL
EGFR: 126 ML/MIN/1.73M2 — SIGNIFICANT CHANGE UP
EOSINOPHIL # BLD AUTO: 0.08 K/UL — SIGNIFICANT CHANGE UP (ref 0–0.7)
EOSINOPHIL NFR BLD AUTO: 0.8 % — SIGNIFICANT CHANGE UP (ref 0–8)
EPI CELLS # UR: ABNORMAL /HPF (ref 0–5)
GLUCOSE SERPL-MCNC: 96 MG/DL — SIGNIFICANT CHANGE UP (ref 70–99)
GLUCOSE UR QL: NEGATIVE MG/DL — SIGNIFICANT CHANGE UP
HCG SERPL QL: NEGATIVE — SIGNIFICANT CHANGE UP
HCT VFR BLD CALC: 39.3 % — SIGNIFICANT CHANGE UP (ref 37–47)
HGB BLD-MCNC: 12.8 G/DL — SIGNIFICANT CHANGE UP (ref 12–16)
IMM GRANULOCYTES NFR BLD AUTO: 0.3 % — SIGNIFICANT CHANGE UP (ref 0.1–0.3)
KETONES UR-MCNC: NEGATIVE — SIGNIFICANT CHANGE UP
LACTATE SERPL-SCNC: 1.2 MMOL/L — SIGNIFICANT CHANGE UP (ref 0.7–2)
LEUKOCYTE ESTERASE UR-ACNC: NEGATIVE — SIGNIFICANT CHANGE UP
LIDOCAIN IGE QN: 20 U/L — SIGNIFICANT CHANGE UP (ref 7–60)
LYMPHOCYTES # BLD AUTO: 3.24 K/UL — SIGNIFICANT CHANGE UP (ref 1.2–3.4)
LYMPHOCYTES # BLD AUTO: 32.3 % — SIGNIFICANT CHANGE UP (ref 20.5–51.1)
MCHC RBC-ENTMCNC: 26.3 PG — LOW (ref 27–31)
MCHC RBC-ENTMCNC: 32.6 G/DL — SIGNIFICANT CHANGE UP (ref 32–37)
MCV RBC AUTO: 80.7 FL — LOW (ref 81–99)
MONOCYTES # BLD AUTO: 0.76 K/UL — HIGH (ref 0.1–0.6)
MONOCYTES NFR BLD AUTO: 7.6 % — SIGNIFICANT CHANGE UP (ref 1.7–9.3)
NEUTROPHILS # BLD AUTO: 5.86 K/UL — SIGNIFICANT CHANGE UP (ref 1.4–6.5)
NEUTROPHILS NFR BLD AUTO: 58.5 % — SIGNIFICANT CHANGE UP (ref 42.2–75.2)
NITRITE UR-MCNC: NEGATIVE — SIGNIFICANT CHANGE UP
NRBC # BLD: 0 /100 WBCS — SIGNIFICANT CHANGE UP (ref 0–0)
PH UR: 6 — SIGNIFICANT CHANGE UP (ref 5–8)
PLATELET # BLD AUTO: 386 K/UL — SIGNIFICANT CHANGE UP (ref 130–400)
PMV BLD: 10.5 FL — HIGH (ref 7.4–10.4)
POTASSIUM SERPL-MCNC: 4.6 MMOL/L — SIGNIFICANT CHANGE UP (ref 3.5–5)
POTASSIUM SERPL-SCNC: 4.6 MMOL/L — SIGNIFICANT CHANGE UP (ref 3.5–5)
PROT SERPL-MCNC: 7.6 G/DL — SIGNIFICANT CHANGE UP (ref 6–8)
PROT UR-MCNC: ABNORMAL MG/DL
RBC # BLD: 4.87 M/UL — SIGNIFICANT CHANGE UP (ref 4.2–5.4)
RBC # FLD: 12.7 % — SIGNIFICANT CHANGE UP (ref 11.5–14.5)
RBC CASTS # UR COMP ASSIST: ABNORMAL /HPF (ref 0–4)
SODIUM SERPL-SCNC: 140 MMOL/L — SIGNIFICANT CHANGE UP (ref 135–146)
SP GR SPEC: >=1.03 (ref 1.01–1.03)
UROBILINOGEN FLD QL: 0.2 MG/DL — SIGNIFICANT CHANGE UP
WBC # BLD: 10.02 K/UL — SIGNIFICANT CHANGE UP (ref 4.8–10.8)
WBC # FLD AUTO: 10.02 K/UL — SIGNIFICANT CHANGE UP (ref 4.8–10.8)
WBC UR QL: SIGNIFICANT CHANGE UP /HPF (ref 0–5)

## 2023-04-29 PROCEDURE — 99284 EMERGENCY DEPT VISIT MOD MDM: CPT

## 2023-04-29 PROCEDURE — 83605 ASSAY OF LACTIC ACID: CPT

## 2023-04-29 PROCEDURE — 80048 BASIC METABOLIC PNL TOTAL CA: CPT

## 2023-04-29 PROCEDURE — 36415 COLL VENOUS BLD VENIPUNCTURE: CPT

## 2023-04-29 PROCEDURE — 99284 EMERGENCY DEPT VISIT MOD MDM: CPT | Mod: 25

## 2023-04-29 PROCEDURE — 84703 CHORIONIC GONADOTROPIN ASSAY: CPT

## 2023-04-29 PROCEDURE — 81001 URINALYSIS AUTO W/SCOPE: CPT

## 2023-04-29 PROCEDURE — 96374 THER/PROPH/DIAG INJ IV PUSH: CPT

## 2023-04-29 PROCEDURE — 83690 ASSAY OF LIPASE: CPT

## 2023-04-29 PROCEDURE — 76830 TRANSVAGINAL US NON-OB: CPT

## 2023-04-29 PROCEDURE — 76830 TRANSVAGINAL US NON-OB: CPT | Mod: 26

## 2023-04-29 PROCEDURE — 85025 COMPLETE CBC W/AUTO DIFF WBC: CPT

## 2023-04-29 PROCEDURE — 80076 HEPATIC FUNCTION PANEL: CPT

## 2023-04-29 RX ORDER — SODIUM CHLORIDE 9 MG/ML
1000 INJECTION INTRAMUSCULAR; INTRAVENOUS; SUBCUTANEOUS ONCE
Refills: 0 | Status: COMPLETED | OUTPATIENT
Start: 2023-04-29 | End: 2023-04-29

## 2023-04-29 RX ORDER — KETOROLAC TROMETHAMINE 30 MG/ML
30 SYRINGE (ML) INJECTION ONCE
Refills: 0 | Status: DISCONTINUED | OUTPATIENT
Start: 2023-04-29 | End: 2023-04-29

## 2023-04-29 RX ADMIN — Medication 30 MILLIGRAM(S): at 16:00

## 2023-04-29 RX ADMIN — SODIUM CHLORIDE 1000 MILLILITER(S): 9 INJECTION INTRAMUSCULAR; INTRAVENOUS; SUBCUTANEOUS at 12:37

## 2023-04-29 NOTE — ED PROVIDER NOTE - PROGRESS NOTE DETAILS
Work-up negative, offered patient CT scan to further evaluate lower abdominal pain but patient refuses and rather follow-up with her GYN as an outpatient.  Will DC with GI follow-up as well.  Patient informed to return if pain worsens or changes her mind about obtaining a CT scan.

## 2023-04-29 NOTE — ED ADULT NURSE NOTE - NSIMPLEMENTINTERV_GEN_ALL_ED
Implemented All Universal Safety Interventions:  [place] to call system. Call bell, personal items and telephone within reach. Instruct patient to call for assistance. Room bathroom lighting operational. Non-slip footwear when patient is off stretcher. Physically safe environment: no spills, clutter or unnecessary equipment. Stretcher in lowest position, wheels locked, appropriate side rails in place.

## 2023-04-29 NOTE — ED PROVIDER NOTE - ATTENDING APP SHARED VISIT CONTRIBUTION OF CARE
Pt reports lower abdominal pain which are migratory x 3 weeks. Pt reports no dysuria, no urinary frequency, + occasional back pain, no nausea, no vomiting or diarrhea, no rash. no fever. On exam S1S2 rrr, lungs clear, no CVA tenderness, no abdominal tenderness, no rash.

## 2023-04-29 NOTE — ED PROVIDER NOTE - CLINICAL SUMMARY MEDICAL DECISION MAKING FREE TEXT BOX
Pt presented with abdominal pain x 3 weeks. Pelvic Ultrasound normal. Pt offered a CT scan to further evaluate. Pt refused. Will follow up as out pt.

## 2023-04-29 NOTE — ED PROVIDER NOTE - ATTENDING SHARED VISIT SELECTORS
Pt resting in bed in NAD with even and unlabored rr.  Will continue to monitor      Orlando Stoddard RN  04/20/19 3990 History/Exam/Medical Decision Making

## 2023-04-29 NOTE — ED ADULT NURSE NOTE - NSFALLRSKUNASSIST_ED_ALL_ED
no
signed Leyla Bocanegra PA-C Pt seen initially in intake by Dr. Walden   89M sent from [facility] for eval of RLE laceration from yesterday at 3pm when a door struck his leg by accident. Pt alert, NAD. 4cm vertical laceration on distal aspect of anterior right shin. Wound edges are not well-approximated but wound is clean and well-appearing at this time and is too old to be sutured. recommend continue daily wound care, bacitracin, f/u PMD. Pt unsure of tetanus, will f/u with PMD. Discussed with nurse Bacon at [facility] regarding wound care. Pt feeling well at DC, agrees with DC and plan of care.

## 2023-04-29 NOTE — ED PROVIDER NOTE - PATIENT PORTAL LINK FT
You can access the FollowMyHealth Patient Portal offered by [facility] by registering at the following website: [email or web address]. By joining [facility]’s FollowMyHealth portal, you will also be able to view your health information using other applications (apps) compatible with our system.

## 2023-04-29 NOTE — ED PROVIDER NOTE - OBJECTIVE STATEMENT
Patient is a 22-year-old female no past history here for evaluation of lower abdominal cramping for 3 weeks intermittently with no aggravating or alleviating factors with radiation to back at times.  Patient denies dysuria, hematuria, fever, chills, vaginal discharge, vaginal bleeding, chest pain, shortness of breath, nausea, vomit

## 2023-04-29 NOTE — ED PROVIDER NOTE - NSFOLLOWUPINSTRUCTIONS_ED_ALL_ED_FT
SEE YOUR GYN AS DISCUSSED AS WELL AS GASTROENTEROLOGY         Our Emergency Department Referral Coordinators will be reaching out to you in the next 24-48 hours from 9:00am to 5:00pm with a follow up appointment. Please expect a phone call from the hospital in that time frame. If you do not receive a call or if you have any questions or concerns, you can reach them at   [phone]

## 2023-04-29 NOTE — ED PROVIDER NOTE - MDM ORDERS SUBMITTED SELECTION
If you are a smoker, it is important for your health to stop smoking. Please be aware that second hand smoke is also harmful. Labs/Imaging Studies/Medications

## 2023-07-05 ENCOUNTER — NON-APPOINTMENT (OUTPATIENT)
Age: 23
End: 2023-07-05

## 2023-08-20 ENCOUNTER — EMERGENCY (EMERGENCY)
Facility: HOSPITAL | Age: 23
LOS: 0 days | Discharge: HOME | End: 2023-08-20
Attending: EMERGENCY MEDICINE
Payer: MEDICAID

## 2023-08-20 VITALS
OXYGEN SATURATION: 97 % | TEMPERATURE: 100 F | RESPIRATION RATE: 20 BRPM | DIASTOLIC BLOOD PRESSURE: 72 MMHG | HEIGHT: 64 IN | HEART RATE: 111 BPM | WEIGHT: 270.07 LBS | SYSTOLIC BLOOD PRESSURE: 130 MMHG

## 2023-08-20 VITALS
RESPIRATION RATE: 16 BRPM | TEMPERATURE: 97 F | OXYGEN SATURATION: 97 % | DIASTOLIC BLOOD PRESSURE: 67 MMHG | SYSTOLIC BLOOD PRESSURE: 108 MMHG | HEART RATE: 71 BPM

## 2023-08-20 DIAGNOSIS — R10.9 UNSPECIFIED ABDOMINAL PAIN: ICD-10-CM

## 2023-08-20 DIAGNOSIS — E28.2 POLYCYSTIC OVARIAN SYNDROME: ICD-10-CM

## 2023-08-20 DIAGNOSIS — J45.909 UNSPECIFIED ASTHMA, UNCOMPLICATED: ICD-10-CM

## 2023-08-20 DIAGNOSIS — Z90.09 ACQUIRED ABSENCE OF OTHER PART OF HEAD AND NECK: ICD-10-CM

## 2023-08-20 DIAGNOSIS — R11.0 NAUSEA: ICD-10-CM

## 2023-08-20 DIAGNOSIS — J02.9 ACUTE PHARYNGITIS, UNSPECIFIED: ICD-10-CM

## 2023-08-20 DIAGNOSIS — Z90.89 ACQUIRED ABSENCE OF OTHER ORGANS: Chronic | ICD-10-CM

## 2023-08-20 DIAGNOSIS — F32.A DEPRESSION, UNSPECIFIED: ICD-10-CM

## 2023-08-20 DIAGNOSIS — Z88.0 ALLERGY STATUS TO PENICILLIN: ICD-10-CM

## 2023-08-20 LAB
ALBUMIN SERPL ELPH-MCNC: 4.1 G/DL — SIGNIFICANT CHANGE UP (ref 3.5–5.2)
ALP SERPL-CCNC: 53 U/L — SIGNIFICANT CHANGE UP (ref 30–115)
ALT FLD-CCNC: 18 U/L — SIGNIFICANT CHANGE UP (ref 0–41)
ANION GAP SERPL CALC-SCNC: 12 MMOL/L — SIGNIFICANT CHANGE UP (ref 7–14)
APPEARANCE UR: CLEAR — SIGNIFICANT CHANGE UP
AST SERPL-CCNC: 23 U/L — SIGNIFICANT CHANGE UP (ref 0–41)
BASOPHILS # BLD AUTO: 0.05 K/UL — SIGNIFICANT CHANGE UP (ref 0–0.2)
BASOPHILS NFR BLD AUTO: 0.5 % — SIGNIFICANT CHANGE UP (ref 0–1)
BILIRUB SERPL-MCNC: 0.4 MG/DL — SIGNIFICANT CHANGE UP (ref 0.2–1.2)
BILIRUB UR-MCNC: NEGATIVE — SIGNIFICANT CHANGE UP
BUN SERPL-MCNC: 13 MG/DL — SIGNIFICANT CHANGE UP (ref 10–20)
CALCIUM SERPL-MCNC: 8.8 MG/DL — SIGNIFICANT CHANGE UP (ref 8.4–10.5)
CHLORIDE SERPL-SCNC: 100 MMOL/L — SIGNIFICANT CHANGE UP (ref 98–110)
CO2 SERPL-SCNC: 21 MMOL/L — SIGNIFICANT CHANGE UP (ref 17–32)
COLOR SPEC: YELLOW — SIGNIFICANT CHANGE UP
CREAT SERPL-MCNC: 0.6 MG/DL — LOW (ref 0.7–1.5)
DIFF PNL FLD: NEGATIVE — SIGNIFICANT CHANGE UP
EGFR: 131 ML/MIN/1.73M2 — SIGNIFICANT CHANGE UP
EOSINOPHIL # BLD AUTO: 0.02 K/UL — SIGNIFICANT CHANGE UP (ref 0–0.7)
EOSINOPHIL NFR BLD AUTO: 0.2 % — SIGNIFICANT CHANGE UP (ref 0–8)
GLUCOSE SERPL-MCNC: 105 MG/DL — HIGH (ref 70–99)
GLUCOSE UR QL: NEGATIVE MG/DL — SIGNIFICANT CHANGE UP
HCT VFR BLD CALC: 40.2 % — SIGNIFICANT CHANGE UP (ref 37–47)
HGB BLD-MCNC: 12.9 G/DL — SIGNIFICANT CHANGE UP (ref 12–16)
IMM GRANULOCYTES NFR BLD AUTO: 0.3 % — SIGNIFICANT CHANGE UP (ref 0.1–0.3)
KETONES UR-MCNC: ABNORMAL MG/DL
LACTATE SERPL-SCNC: 0.9 MMOL/L — SIGNIFICANT CHANGE UP (ref 0.7–2)
LEUKOCYTE ESTERASE UR-ACNC: NEGATIVE — SIGNIFICANT CHANGE UP
LIDOCAIN IGE QN: 16 U/L — SIGNIFICANT CHANGE UP (ref 7–60)
LYMPHOCYTES # BLD AUTO: 2.5 K/UL — SIGNIFICANT CHANGE UP (ref 1.2–3.4)
LYMPHOCYTES # BLD AUTO: 25.3 % — SIGNIFICANT CHANGE UP (ref 20.5–51.1)
MCHC RBC-ENTMCNC: 27.2 PG — SIGNIFICANT CHANGE UP (ref 27–31)
MCHC RBC-ENTMCNC: 32.1 G/DL — SIGNIFICANT CHANGE UP (ref 32–37)
MCV RBC AUTO: 84.6 FL — SIGNIFICANT CHANGE UP (ref 81–99)
MONOCYTES # BLD AUTO: 0.63 K/UL — HIGH (ref 0.1–0.6)
MONOCYTES NFR BLD AUTO: 6.4 % — SIGNIFICANT CHANGE UP (ref 1.7–9.3)
NEUTROPHILS # BLD AUTO: 6.67 K/UL — HIGH (ref 1.4–6.5)
NEUTROPHILS NFR BLD AUTO: 67.3 % — SIGNIFICANT CHANGE UP (ref 42.2–75.2)
NITRITE UR-MCNC: NEGATIVE — SIGNIFICANT CHANGE UP
NRBC # BLD: 0 /100 WBCS — SIGNIFICANT CHANGE UP (ref 0–0)
PH UR: 6.5 — SIGNIFICANT CHANGE UP (ref 5–8)
PLATELET # BLD AUTO: 381 K/UL — SIGNIFICANT CHANGE UP (ref 130–400)
PMV BLD: 10.4 FL — SIGNIFICANT CHANGE UP (ref 7.4–10.4)
POTASSIUM SERPL-MCNC: 4.2 MMOL/L — SIGNIFICANT CHANGE UP (ref 3.5–5)
POTASSIUM SERPL-SCNC: 4.2 MMOL/L — SIGNIFICANT CHANGE UP (ref 3.5–5)
PROT SERPL-MCNC: 7.5 G/DL — SIGNIFICANT CHANGE UP (ref 6–8)
PROT UR-MCNC: NEGATIVE MG/DL — SIGNIFICANT CHANGE UP
RBC # BLD: 4.75 M/UL — SIGNIFICANT CHANGE UP (ref 4.2–5.4)
RBC # FLD: 13.7 % — SIGNIFICANT CHANGE UP (ref 11.5–14.5)
SODIUM SERPL-SCNC: 133 MMOL/L — LOW (ref 135–146)
SP GR SPEC: 1.01 — SIGNIFICANT CHANGE UP (ref 1–1.03)
UROBILINOGEN FLD QL: 0.2 MG/DL — SIGNIFICANT CHANGE UP (ref 0.2–1)
WBC # BLD: 9.9 K/UL — SIGNIFICANT CHANGE UP (ref 4.8–10.8)
WBC # FLD AUTO: 9.9 K/UL — SIGNIFICANT CHANGE UP (ref 4.8–10.8)

## 2023-08-20 PROCEDURE — 85025 COMPLETE CBC W/AUTO DIFF WBC: CPT

## 2023-08-20 PROCEDURE — 99284 EMERGENCY DEPT VISIT MOD MDM: CPT | Mod: 25

## 2023-08-20 PROCEDURE — 96375 TX/PRO/DX INJ NEW DRUG ADDON: CPT

## 2023-08-20 PROCEDURE — 36415 COLL VENOUS BLD VENIPUNCTURE: CPT

## 2023-08-20 PROCEDURE — 83605 ASSAY OF LACTIC ACID: CPT

## 2023-08-20 PROCEDURE — 80053 COMPREHEN METABOLIC PANEL: CPT

## 2023-08-20 PROCEDURE — 83690 ASSAY OF LIPASE: CPT

## 2023-08-20 PROCEDURE — 96374 THER/PROPH/DIAG INJ IV PUSH: CPT

## 2023-08-20 PROCEDURE — 96361 HYDRATE IV INFUSION ADD-ON: CPT

## 2023-08-20 PROCEDURE — 81003 URINALYSIS AUTO W/O SCOPE: CPT

## 2023-08-20 RX ORDER — FAMOTIDINE 10 MG/ML
20 INJECTION INTRAVENOUS ONCE
Refills: 0 | Status: COMPLETED | OUTPATIENT
Start: 2023-08-20 | End: 2023-08-20

## 2023-08-20 RX ORDER — SODIUM CHLORIDE 9 MG/ML
2000 INJECTION, SOLUTION INTRAVENOUS ONCE
Refills: 0 | Status: COMPLETED | OUTPATIENT
Start: 2023-08-20 | End: 2023-08-20

## 2023-08-20 RX ORDER — ONDANSETRON 8 MG/1
4 TABLET, FILM COATED ORAL ONCE
Refills: 0 | Status: COMPLETED | OUTPATIENT
Start: 2023-08-20 | End: 2023-08-20

## 2023-08-20 RX ORDER — KETOROLAC TROMETHAMINE 30 MG/ML
30 SYRINGE (ML) INJECTION ONCE
Refills: 0 | Status: DISCONTINUED | OUTPATIENT
Start: 2023-08-20 | End: 2023-08-20

## 2023-08-20 RX ADMIN — Medication 30 MILLIGRAM(S): at 07:36

## 2023-08-20 RX ADMIN — FAMOTIDINE 20 MILLIGRAM(S): 10 INJECTION INTRAVENOUS at 06:05

## 2023-08-20 RX ADMIN — ONDANSETRON 4 MILLIGRAM(S): 8 TABLET, FILM COATED ORAL at 06:05

## 2023-08-20 RX ADMIN — SODIUM CHLORIDE 2000 MILLILITER(S): 9 INJECTION, SOLUTION INTRAVENOUS at 06:04

## 2023-08-20 RX ADMIN — SODIUM CHLORIDE 2000 MILLILITER(S): 9 INJECTION, SOLUTION INTRAVENOUS at 07:35

## 2023-08-20 RX ADMIN — Medication 20 MILLIGRAM(S): at 07:35

## 2023-08-20 RX ADMIN — Medication 30 MILLIGRAM(S): at 08:05

## 2023-08-20 NOTE — ED ADULT NURSE REASSESSMENT NOTE - NS ED NURSE REASSESS COMMENT FT1
Pt reassessed. Pt confirms feeling better after medication administration. Pt has been discharged home and will follow up as instructed.

## 2023-08-20 NOTE — ED PROVIDER NOTE - ATTENDING APP SHARED VISIT CONTRIBUTION OF CARE
34-year-old female, history of tonsillectomy, asthma presents to the ED with sore throat, abdominal pain and nausea for a while.  Was seen by GI, had an upper and lower endoscopy.  No diarrhea.  Exam shows alert patient in no distress, HEENT NCAT PERRL, neck supple, throat no exudates, lungs clear, RR S1S2, abdomen soft NT +BS, no CCE.

## 2023-08-20 NOTE — ED PROVIDER NOTE - PHYSICAL EXAMINATION
Physical Exam    Vital Signs: I have reviewed the initial vital signs.  Constitutional: well-nourished, appears stated age, no acute distress  Eyes: Conjunctiva pink, Sclera clear  Cardiovascular: S1 and S2, regular rate, regular rhythm, well-perfused extremities, radial pulses equal and 2+ b/l.   Respiratory: unlabored respiratory effort, clear to auscultation bilaterally no wheezing, rales and rhonchi. pt is speaking full sentences. no accessory muscle use.   Gastrointestinal: soft, non-tender, nondistended abdomen, no pulsatile mass, no rebound, no guarding  Musculoskeletal: FROM of b/l upper and lower extremities.   Integumentary: warm, dry, no rash  Neurologic: awake, alert, extremities’ motor and sensory functions grossly intact.  Psychiatric: appropriate mood, appropriate affect

## 2023-08-20 NOTE — ED ADULT NURSE NOTE - NSFALLUNIVINTERV_ED_ALL_ED
Bed/Stretcher in lowest position, wheels locked, appropriate side rails in place/Call bell, personal items and telephone in reach/Instruct patient to call for assistance before getting out of bed/chair/stretcher/Non-slip footwear applied when patient is off stretcher/[place] to call system/Physically safe environment - no spills, clutter or unnecessary equipment/Purposeful proactive rounding/Room/bathroom lighting operational, light cord in reach

## 2023-08-20 NOTE — ED PROVIDER NOTE - CLINICAL SUMMARY MEDICAL DECISION MAKING FREE TEXT BOX
Patient signed to me f/u [facility] and re-eval. Presents to the ED for evaluation of sore throat for 1-1/2 months, and abdominal pain for several months. Here pt on exam normal posterior OP, abd soft nt/nd. labs unremarkable. pt felt better after meds. Outpt f/u.

## 2023-08-20 NOTE — ED PROVIDER NOTE - PATIENT PORTAL LINK FT
You can access the FollowMyHealth Patient Portal offered by [facility] by registering at the following website: [email or web address]. By joining [facility]’s FollowMyHealth portal, you will also be able to view your health information using other applications (apps) compatible with our system.

## 2023-08-20 NOTE — ED PROVIDER NOTE - OBJECTIVE STATEMENT
22-year-old female with a past medical history of PCOS, depression,.  Presents to the ED for evaluation of sore throat for 1-1/2 months, and abdominal pain for several months.  Patient reports 1 week ago she threw up once that was bright red and this morning she felt nauseous and was scared she would throw up but again so she came to the ED.  Patient reports she had an endoscopy and colonoscopy which showed "stones "in November 2022.  Patient's last menstrual period finished 2 days ago.  Patient denies fever, difficulty swallowing, drooling, runny nose, ear pain, chest pain, shortness of breath, diarrhea constipation, urinary symptoms, history of abdominal surgeries, excessive use of alcohol or NSAIDs, weakness, or tingling.

## 2023-08-20 NOTE — ED PROVIDER NOTE - NSFOLLOWUPINSTRUCTIONS_ED_ALL_ED_FT
Our Emergency Department Referral Coordinators will be reaching out to you in the next 24-48 hours from 9:00am to 5:00pm with a follow up appointment. Please expect a phone call from the hospital in that time frame. If you do not receive a call or if you have any questions or concerns, you can reach them at   [phone]      Abdominal Pain    Many things can cause abdominal pain. Many times, abdominal pain is not caused by a disease and will improve without treatment. Your health care provider will do a physical exam to determine if there is a dangerous cause of your pain; blood tests and imaging may help determine the cause of your pain. However, in many cases, no cause may be found and you may need further testing as an outpatient. Monitor your abdominal pain for any changes.     SEEK IMMEDIATE MEDICAL CARE IF YOU HAVE ANY OF THE FOLLOWING SYMPTOMS: worsening abdominal pain, uncontrollable vomiting, profuse diarrhea, inability to have bowel movements or pass gas, black or bloody stools, fever accompanying chest pain or back pain, or fainting. These symptoms may represent a serious problem that is an emergency. Do not wait to see if the symptoms will go away. Get medical help right away. Call 911 and do not drive yourself to the hospital.    Pharyngitis    Pharyngitis is inflammation of your pharynx, which is typically caused by a viral or bacterial infection. Pharyngitis can be contagious and may spread from person to person through intimate contact, coughing, sneezing, or sharing personal items and utensils. Symptoms of pharyngitis may include sore throat, fever, headache, or swollen lymph nodes. If you are prescribed antibiotics, make sure you finish them even if you start to feel better. Gargle with salt water as often as every 1-2 hours to soothe your throat. Throat lozenges (if you are not at risk for choking) or sprays may be used to soothe your throat.    SEEK IMMEDIATE MEDICAL CARE IF YOU HAVE ANY OF THE FOLLOWING SYMPTOMS: neck stiffness, drooling, hoarseness or change in voice, inability to swallow liquids, vomiting, or trouble breathing.

## 2023-09-08 ENCOUNTER — APPOINTMENT (OUTPATIENT)
Dept: GASTROENTEROLOGY | Facility: CLINIC | Age: 23
End: 2023-09-08
Payer: MEDICAID

## 2023-09-08 VITALS
HEIGHT: 64 IN | OXYGEN SATURATION: 99 % | DIASTOLIC BLOOD PRESSURE: 70 MMHG | WEIGHT: 280 LBS | BODY MASS INDEX: 47.8 KG/M2 | HEART RATE: 80 BPM | SYSTOLIC BLOOD PRESSURE: 110 MMHG

## 2023-09-08 DIAGNOSIS — R10.9 UNSPECIFIED ABDOMINAL PAIN: ICD-10-CM

## 2023-09-08 PROCEDURE — 99213 OFFICE O/P EST LOW 20 MIN: CPT

## 2023-09-08 RX ORDER — PANTOPRAZOLE 40 MG/1
40 TABLET, DELAYED RELEASE ORAL
Qty: 30 | Refills: 2 | Status: ACTIVE | COMMUNITY
Start: 2023-09-08 | End: 1900-01-01

## 2023-09-08 NOTE — PHYSICAL EXAM
[Alert] : alert [Well Developed] : well developed [Well Nourished] : well nourished [Normal] : heart rate was normal and rhythm regular, normal S1 and S2, no murmurs [No Masses] : no abdominal mass palpated [Abdomen Soft] : soft [Patient Refused] : rectal exam was refused by the patient [de-identified] : abd soft, mild diffuse tenderness on palpation, no focal RUQ pain elicited

## 2023-09-08 NOTE — ASSESSMENT
[FreeTextEntry1] : 23yo female h/o anxiety/depression, gallstone presents for follow up of intermittent abdominal pain, early satiety s/p CT abd/pelvis which was unremarkable and ultrasound revealing gallstone and hepatic steatosis. EGD/colonoscopy showing mild gastritis and hemorrhoids otherwise overall unremarkable. No anemia, LFTs normal. Symptoms not consistent with IBS or biliary type pain though cannot exclude, suspect some functional component.  -Recommend gastric emptying study r/o gastroparesis -Trial pantoprazole 40mg daily taken 20-30 minutes before breakfast -Pt still requesting alternate surgical opinion to discuss cholecystectomy, also explained that symptoms could persist after gallbladder removed -Discussed psych assessment though pt declined, would consider trial of low dose TCA pending above -Encouraged adequate hydration, moderate fiber intake -Discussed avoidance of fatty, greasy foods -Discussed lifestyle/dietary modifications  Follow up after 4-6 weeks Discussed in detail with pt/pts grandmother Pt/pts grandmother agreeable with plan, advised to contact us if questions/concerns

## 2023-09-08 NOTE — REASON FOR VISIT
[Follow-up] : a follow-up of an existing diagnosis [FreeTextEntry1] : RPA - ABDOMINAL PAIN, LAST SEEN 10/2022

## 2023-09-08 NOTE — HISTORY OF PRESENT ILLNESS
[FreeTextEntry1] : 23yo female h/o anxiety/depression presents for follow up of abdominal pain  Pt reports developing RUQ/abdominal pain prompting ED evaluation in 7/2022, had CT abd/pelvis which was unremarkable, advised outpt follow up. Pt had ultrasound imaging performed revealing 1.3cm gallstone and hepatic steatosis. EGD performed on 11/8/22 revealing mild chronic gastritis, no evidence of H pylori or celiac disease Colonoscopy showing small internal hemorrhoids otherwise unremarkable  Pt presents with grandmother for visit, last seen in 10/2022, pts grandmother cancelled the follow up visit as pt was in [place]. She continues to report abdominal pain, mostly mid abdomen, nonradiating, no obvious worsening with food or relation to bm. Notes early satiety, bloating and occasional episodes of nausea/vomiting. Occasional heartburn, denies dysphagia. Tried pepcid without relief, does not recall PPI. Reports regular bm daily, denies recurrent rectal bleeding. Appetite ok,  Seen by surgeons Dr. Damico and Dr. Singh, advised psych assessment prior to considering cholecystectomy. Pt now requesting another opinion and still wants to have gallbladder removed. She does not want to see psych.   No known family h/o colon ca  Labs reviewed (8/2023): Hb 12.9, mcv 84.6 LFTs normal Lipase normal  Ultrasound (10/2022): gallstone, fatty liver

## 2023-10-09 ENCOUNTER — OUTPATIENT (OUTPATIENT)
Dept: OUTPATIENT SERVICES | Facility: HOSPITAL | Age: 23
LOS: 1 days | End: 2023-10-09

## 2023-10-09 DIAGNOSIS — R10.13 EPIGASTRIC PAIN: ICD-10-CM

## 2023-10-09 DIAGNOSIS — Z90.89 ACQUIRED ABSENCE OF OTHER ORGANS: Chronic | ICD-10-CM

## 2023-10-09 DIAGNOSIS — R11.0 NAUSEA: ICD-10-CM

## 2023-10-10 DIAGNOSIS — R11.0 NAUSEA: ICD-10-CM

## 2023-10-10 DIAGNOSIS — R10.13 EPIGASTRIC PAIN: ICD-10-CM

## 2023-12-19 NOTE — ED PROVIDER NOTE - NSDCPRINTRESULTS_ED_ALL_ED
Guide removed intact. Patient requests all Lab and Radiology Results on their Discharge Instructions

## 2024-02-22 NOTE — ASU DISCHARGE PLAN (ADULT/PEDIATRIC) - WILL THE PATIENT ACCEPT THE PFIZER COVID-19 VACCINE IF ELIGIBLE AND IT IS AVAILABLE?
2020: Got patient back from bathroom and she states she had Left sided chest pain, was having difficulty breathing and felt like her heart was fluttering.     2024: called Charge RN into room to lay eyes on patient and took patients vitals.     2030: called Rapid TICU RN to lay eyes on patient     2038: called chest pain rapid to patients room     [facility] about change in patients condition. Informed her, TICU RN had already ordered ECG and was performed on patient already.     2051: called chest pain rapid to patients room again after first time was paged out.     2058: patient was given one time dose of Nitroglycerin from [facility] RN's. Per patient pain subsisted from 8/10 to 4/10 pain.      2130: patient declines medication at this time for pain and just wants to rest            No

## 2024-03-13 NOTE — ASU DISCHARGE PLAN (ADULT/PEDIATRIC) - FREQUENT HAND WASHING PREVENTS THE SPREAD OF INFECTION.
Detail Level: None Consent: Written consent obtained, risks reviewed including but not limited to rash, itching, allergic reaction, systemic rash, remote possiblity of anaphylaxis to allergen. Number Of Patches (Maximum Allowable Per Dos By Cms Is 90): 37 Post-Care Instructions: I reviewed with the patient in detail post-care instructions. Patient should not sweat, pick at, or get the patches wet for 48 hours. Statement Selected

## 2024-05-13 NOTE — ASSESSMENT
[FreeTextEntry1] : 20 yo otherwise healthy F with left nipple laceration s/p minor trauma now completely epithelialized. \par \par - patient will require an office repair of left nipple under local anesthesia in approximately 2 months\par - all risks and benefits were discussed and her questions were answered to her apparent satisfaction, informed consent obtained\par - daily Aquaphor for now\par \par Seen and examined with .  Street

## 2024-08-22 NOTE — ED PROVIDER NOTE - DISCHARGE DATE
AMS (altered mental status) AMS (altered mental status) AMS (altered mental status) 20-Jun-2018 AMS (altered mental status) AMS (altered mental status) AMS (altered mental status) AMS (altered mental status) AMS (altered mental status) AMS (altered mental status)

## 2024-10-24 ENCOUNTER — APPOINTMENT (OUTPATIENT)
Dept: SURGERY | Facility: CLINIC | Age: 24
End: 2024-10-24
Payer: MEDICAID

## 2024-10-24 VITALS
OXYGEN SATURATION: 96 % | DIASTOLIC BLOOD PRESSURE: 70 MMHG | WEIGHT: 262 LBS | HEART RATE: 94 BPM | BODY MASS INDEX: 45.85 KG/M2 | HEIGHT: 63.5 IN | SYSTOLIC BLOOD PRESSURE: 110 MMHG

## 2024-10-24 DIAGNOSIS — E55.9 VITAMIN D DEFICIENCY, UNSPECIFIED: ICD-10-CM

## 2024-10-24 PROCEDURE — 99203 OFFICE O/P NEW LOW 30 MIN: CPT

## 2024-10-24 RX ORDER — ERGOCALCIFEROL 1.25 MG/1
1.25 MG CAPSULE, LIQUID FILLED ORAL
Qty: 8 | Refills: 0 | Status: ACTIVE | COMMUNITY
Start: 2024-10-24 | End: 1900-01-01

## 2024-10-29 ENCOUNTER — OUTPATIENT (OUTPATIENT)
Dept: OUTPATIENT SERVICES | Facility: HOSPITAL | Age: 24
LOS: 1 days | End: 2024-10-29
Payer: MEDICAID

## 2024-10-29 VITALS
HEIGHT: 63.5 IN | SYSTOLIC BLOOD PRESSURE: 128 MMHG | WEIGHT: 263.01 LBS | DIASTOLIC BLOOD PRESSURE: 84 MMHG | OXYGEN SATURATION: 99 % | TEMPERATURE: 98 F | HEART RATE: 81 BPM | RESPIRATION RATE: 16 BRPM

## 2024-10-29 DIAGNOSIS — K80.20 CALCULUS OF GALLBLADDER WITHOUT CHOLECYSTITIS WITHOUT OBSTRUCTION: ICD-10-CM

## 2024-10-29 DIAGNOSIS — Z98.890 OTHER SPECIFIED POSTPROCEDURAL STATES: Chronic | ICD-10-CM

## 2024-10-29 DIAGNOSIS — Z90.89 ACQUIRED ABSENCE OF OTHER ORGANS: Chronic | ICD-10-CM

## 2024-10-29 DIAGNOSIS — Z01.818 ENCOUNTER FOR OTHER PREPROCEDURAL EXAMINATION: ICD-10-CM

## 2024-10-29 PROCEDURE — 99214 OFFICE O/P EST MOD 30 MIN: CPT | Mod: 25

## 2024-10-29 NOTE — H&P PST ADULT - NSICDXPASTMEDICALHX_GEN_ALL_CORE_FT
PAST MEDICAL HISTORY:  Attention deficit disorder (ADD)     Depression     Gallstones     Obesity     Polycystic ovary     Vapes nicotine containing substance

## 2024-10-29 NOTE — H&P PST ADULT - NSANTHOSAYNRD_GEN_A_CORE
No. YASEMIN screening performed.  STOP BANG Legend: 0-2 = LOW Risk; 3-4 = INTERMEDIATE Risk; 5-8 = HIGH Risk

## 2024-10-29 NOTE — H&P PST ADULT - NSICDXPASTSURGICALHX_GEN_ALL_CORE_FT
PAST SURGICAL HISTORY:  H/O colonoscopy     H/O esophagogastroduodenoscopy     History of elective      History of tonsillectomy

## 2024-10-29 NOTE — H&P PST ADULT - NSICDXFAMILYHX_GEN_ALL_CORE_FT
FAMILY HISTORY:  Mother  Still living? Yes, Estimated age: 31-40  FH: brain aneurysm, Age at diagnosis: Age Unknown

## 2024-10-29 NOTE — H&P PST ADULT - HISTORY OF PRESENT ILLNESS
y Male/Female presents today for presurgical testing for . Per Sx note dated 10/24/24 "23 year old female being seen for a consultation visit, Patient here to discuss [facility] program.  ?  History of Present Illness         Current weight: 262 lbs/ height: 63.5 inches/ BMI 45.7  ?  No previous use of weight loss medications  No previous weight loss surgery  ?  Patient would like to utilize compounded wegovy  ?  Patient having RUQ pain after eating greasy or fatty food. Evaluated with US that showed 1.5 cm gallstone that is mobile within gallbladder. Pain is localized to RUQ and can be quite severe. She has had mutliple recurent episodes. No jaundice. No evidence of cholecystitis. No fever or chills.  Plan  Vitamin D deficiency  Start: Vitamin D (Ergocalciferol) 1.25 MG (84725 UT) Oral Capsule; TAKE 1 CAPSULE BY  MOUTH ONE TIME PER WEEK  1. Plan robotic cholecystectomy- should have cholecystectomy prior to start of GLP-1  2. Once recoved from surgery plan to start Wegovy 0.25 mg weekly for 4 weeks then increase dose to 0.5 mg weekly"  Patient states above is true and accurate, she offers no other complaints at this time, now for scheduled procedure.   Patient/guardian denies any CP, palpitations, SOB, cough, or dysuria. No recent URI or UTI.  Stated exercise tolerance is FOS 4  YASEMIN screen reviewed    Patient/guardian denies any recent personal exposure to COVID19. Denies any sick contacts. Patient/guardian denies travel within the past 30 days. Patient was instructed to quarantine until after procedure.    Anesthesia Alert  NO--Difficult Airway - Class II  NO--History of neck surgery or radiation  NO--Limited ROM of neck  NO--History of Malignant hyperthermia  NO--Personal or family history of Pseudocholinesterase deficiency.  NO--Prior Anesthesia Complication  NO--Latex Allergy  NO--Loose teeth  NO--History of Rheumatoid Arthritis  NO--YASEMIN  NO--Bleeding risk  NO--Other_____    Duke Activity Status Index (DASI) from [email or web address]  on 10/29/2024  ** All calculations should be rechecked by clinician prior to use **    RESULT SUMMARY:  58.2 points  The higher the score (maximum 58.2), the higher the functional status.    9.89 METs        INPUTS:  Take care of self —> 2.75 = Yes  Walk indoors —> 1.75 = Yes  Walk 1&ndash;2 blocks on level ground —> 2.75 = Yes  Climb a flight of stairs or walk up a hill —> 5.5 = Yes  Run a short distance —> 8 = Yes  Do light work around the house —> 2.7 = Yes  Do moderate work around the house —> 3.5 = Yes  Do heavy work around the house —> 8 = Yes  Do yardwork —> 4.5 = Yes  Have sexual relations —> 5.25 = Yes  Participate in moderate recreational activities —> 6 = Yes  Participate in strenuous sports —> 7.5 = Yes    Revised Cardiac Risk Index for Pre-Operative Risk from [email or web address]  on 10/29/2024  ** All calculations should be rechecked by clinician prior to use **    RESULT SUMMARY:  1 points  Class II Risk    6.0 %  30-day risk of death, MI, or cardiac arrest    From [employer]nataly 2017. These numbers are higher than those from the original study (eKvin 1999). See Evidence for details.      INPUTS:  Elevated-risk surgery —> 1 = Yes  History of ischemic heart disease —> 0 = No  History of congestive heart failure —> 0 = No  History of cerebrovascular disease —> 0 = No  Pre-operative treatment with insulin —> 0 = No  Pre-operative creatinine >2 mg/dL / 176.8 µmol/L —> 0 = No    Patient/guardian states that this is their complete medical history and list of medications.  Patient/guardian understands instructions given during this visit and was given the opportunity to ask questions and have them answered. They were instructed to follow up with their surgeon/surgeon's office with any questions regarding their procedure.

## 2024-10-30 DIAGNOSIS — K80.20 CALCULUS OF GALLBLADDER WITHOUT CHOLECYSTITIS WITHOUT OBSTRUCTION: ICD-10-CM

## 2024-10-30 DIAGNOSIS — Z01.818 ENCOUNTER FOR OTHER PREPROCEDURAL EXAMINATION: ICD-10-CM

## 2024-10-30 PROBLEM — E11.9 TYPE 2 DIABETES MELLITUS WITHOUT COMPLICATIONS: Chronic | Status: INACTIVE | Noted: 2018-09-26 | Resolved: 2024-10-29

## 2024-11-04 NOTE — ASU PATIENT PROFILE, ADULT - AS SC BRADEN NUTRITION
(4) excellent O-T Plasty Text: The defect edges were debeveled with a #15 scalpel blade.  Given the location of the defect, shape of the defect and the proximity to free margins an O-T plasty was deemed most appropriate.  Using a sterile surgical marker, an appropriate O-T plasty was drawn incorporating the defect and placing the expected incisions within the relaxed skin tension lines where possible.    The area thus outlined was incised deep to adipose tissue with a #15 scalpel blade.  The skin margins were undermined to an appropriate distance in all directions utilizing iris scissors.

## 2024-11-04 NOTE — ASU PATIENT PROFILE, ADULT - TEACHING/LEARNING CULTURAL CONSIDERATIONS
5/4/2019  Elizabet Keith    PROCEDURE PERFORMED:   Esophagogastroduodenoscopy with biopsies.     INDICATION FOR PROCEDURE:   Dysphagia, foreign body in the esophagus     POSTPROCEDURAL DIAGNOSES:   1. Esophagus with linear furrows and concentric rings with appearance suggestive of eosinophilic esophagitis, biopsies obtained  2. No foreign body/food bolus identified    MEDICATIONS:   as per Anesthesia - general anesthesia for airway protection    DESCRIPTION OF PROCEDURE:   After risks, benefits and alternatives of the procedure were explained, informed consent was obtained. The EGD scope was introduced to the mouth and advanced to the second portion of the duodenum. The instrument was withdrawn as the mucosa was examined.     FINDINGS:   Duodenum, first and second portion were normal. Gastric antrum had mucosal erythema, edema consistent with mild gastritis. Retroflexion in the stomach did not reveal any other abnormalities. GE junction was normal. Esophagus with linear furrows and concentric rings with appearance suggestive of eosinophilic esophagitis, biopsies obtained. Scope was removed and procedure was terminated.     COMPLICATIONS:   None.     ENDOSCOPIC IMPRESSION:   1. Esophagus with linear furrows and concentric rings with appearance suggestive of eosinophilic esophagitis, biopsies obtained  2. No foreign body/food bolus identified    RECOMMENDATION:   1. Follow up biopsies.   2. Soft diet. Proton pump inhibitors. If esophageal biopsies confirm eosinophilic esophagitis will add fluticasone swallow  3. Follow up in the GI clinic.     Thank you very much for allowing me to participate in the management of this patient     none

## 2024-11-05 ENCOUNTER — RESULT REVIEW (OUTPATIENT)
Age: 24
End: 2024-11-05

## 2024-11-05 ENCOUNTER — OUTPATIENT (OUTPATIENT)
Dept: OUTPATIENT SERVICES | Facility: HOSPITAL | Age: 24
LOS: 1 days | Discharge: HOME | End: 2024-11-05
Payer: MEDICAID

## 2024-11-05 ENCOUNTER — TRANSCRIPTION ENCOUNTER (OUTPATIENT)
Age: 24
End: 2024-11-05

## 2024-11-05 ENCOUNTER — APPOINTMENT (OUTPATIENT)
Dept: SURGERY | Facility: HOSPITAL | Age: 24
End: 2024-11-05

## 2024-11-05 VITALS
DIASTOLIC BLOOD PRESSURE: 90 MMHG | SYSTOLIC BLOOD PRESSURE: 131 MMHG | OXYGEN SATURATION: 100 % | HEART RATE: 60 BPM | RESPIRATION RATE: 16 BRPM

## 2024-11-05 VITALS
TEMPERATURE: 98 F | OXYGEN SATURATION: 97 % | SYSTOLIC BLOOD PRESSURE: 116 MMHG | WEIGHT: 263.01 LBS | RESPIRATION RATE: 14 BRPM | HEART RATE: 67 BPM | DIASTOLIC BLOOD PRESSURE: 64 MMHG | HEIGHT: 63.5 IN

## 2024-11-05 DIAGNOSIS — Z98.890 OTHER SPECIFIED POSTPROCEDURAL STATES: Chronic | ICD-10-CM

## 2024-11-05 DIAGNOSIS — K80.20 CALCULUS OF GALLBLADDER WITHOUT CHOLECYSTITIS WITHOUT OBSTRUCTION: ICD-10-CM

## 2024-11-05 DIAGNOSIS — Z90.89 ACQUIRED ABSENCE OF OTHER ORGANS: Chronic | ICD-10-CM

## 2024-11-05 PROCEDURE — C9399: CPT

## 2024-11-05 PROCEDURE — C9290: CPT

## 2024-11-05 PROCEDURE — S2900: CPT

## 2024-11-05 PROCEDURE — 88304 TISSUE EXAM BY PATHOLOGIST: CPT

## 2024-11-05 PROCEDURE — 47562 LAPAROSCOPIC CHOLECYSTECTOMY: CPT

## 2024-11-05 PROCEDURE — 88304 TISSUE EXAM BY PATHOLOGIST: CPT | Mod: 26

## 2024-11-05 RX ORDER — OXYCODONE HYDROCHLORIDE 30 MG/1
1 TABLET ORAL
Qty: 8 | Refills: 0
Start: 2024-11-05 | End: 2024-11-06

## 2024-11-05 RX ORDER — HYDROMORPHONE HCL/0.9% NACL/PF 6 MG/30 ML
0.5 PATIENT CONTROLLED ANALGESIA SYRINGE INTRAVENOUS
Refills: 0 | Status: DISCONTINUED | OUTPATIENT
Start: 2024-11-05 | End: 2024-11-05

## 2024-11-05 NOTE — ASU DISCHARGE PLAN (ADULT/PEDIATRIC) - FINANCIAL ASSISTANCE
[facility] provides services at a reduced cost to those who are determined to be eligible through [facility]’s financial assistance program. Information regarding [facility]’s financial assistance program can be found by going to [email or web address] or by calling [phone].

## 2024-11-05 NOTE — PRE-ANESTHESIA EVALUATION ADULT - HEART RATE (BEATS/MIN)
68 Cough x2 months. Seen at UC, given azithromycin but no improvement. Pt awake, alert, acting appropriately. Coloring appropriate. EAsy WOB noted. Denies PMH, NKDA, IUTD.

## 2024-11-05 NOTE — CHART NOTE - NSCHARTNOTEFT_GEN_A_CORE
PACU ANESTHESIA ADMISSION NOTE      Procedure: Robot-assisted cholecystectomy      Post op diagnosis:  Biliary colic        ____  Intubated  TV:______       Rate: ______      FiO2: ______    __x__  Patent Airway    __x__  Full return of protective reflexes    __x__  Full recovery from anesthesia / back to baseline status      Mental Status:  __x__ Awake   ___x__ Alert   _____ Drowsy   _____ Sedated    Nausea/Vomiting:  __x__ NO  ______Yes,   See Post - Op Orders          Pain Scale (0-10):  ___33__    Treatment: ____ None    __x__ See Post - Op/PCA Orders    Post - Operative Fluids:   ____ Oral   __x__ See Post - Op Orders    Plan: Discharge:   _x___Home       _____Floor     _____Critical Care    _____  Other:_________________    Comments: Patient had smooth intraoperative event, no anesthesia complication.      PACU Vital signs: HR:    67        BP:   138     /     81     RR:     16        O2 Sat:   99    %     Temp 98

## 2024-11-05 NOTE — ASU DISCHARGE PLAN (ADULT/PEDIATRIC) - ASU DC SPECIAL INSTRUCTIONSFT
-Diet: Recommend low fat diet as tolerated. Please avoid roughage like beef for now and instead stick to softer foods including chicken, fish, and pastas.  -Activity: Avoid heavy lifting or straining (anything over 10-15 pounds) for at least 6 weeks. You may ambulate and otherwise resume normal daily activities as tolerated.   -Incisions: Please leave glue in place as it will fall off over time. You may shower and allow soap and water to rinse over incisions beginning 11/6/24. Please avoid scrubbing the areas and do not submerge your incisions in water for at least 2 weeks.  -Medications: You may resume your home medications. You may take Tylenol 1000 mg every 8 hours and alternate with Ibuprofen 600 mg every 8 hours for pain.   -Follow-up: Please call the office to schedule a follow-up appointment with Dr. Lowe within 1-2 weeks, or follow-up as previously scheduled.  -Please call the office or return to the ED with persistent fever greater than 100.4F, chest pain, shortness of breath, uncontrollable nausea/vomiting/abdominal pain, constipation, bloody bowel movements, abdominal distention, inability to tolerate oral intake.

## 2024-11-05 NOTE — ASU DISCHARGE PLAN (ADULT/PEDIATRIC) - CARE PROVIDER_API CALL
Terrance Lowe  Surgery  [address] [place] [address], [address] [place] [address]  Phone: [phone]  Fax: [phone]  Follow Up Time:

## 2024-11-07 ENCOUNTER — NON-APPOINTMENT (OUTPATIENT)
Age: 24
End: 2024-11-07

## 2024-11-07 LAB — SURGICAL PATHOLOGY STUDY: SIGNIFICANT CHANGE UP

## 2024-11-07 RX ORDER — OXYCODONE 5 MG/1
5 TABLET ORAL
Qty: 10 | Refills: 0 | Status: ACTIVE | COMMUNITY
Start: 2024-11-07 | End: 1900-01-01

## 2024-11-11 DIAGNOSIS — Z88.0 ALLERGY STATUS TO PENICILLIN: ICD-10-CM

## 2024-11-11 DIAGNOSIS — K80.10 CALCULUS OF GALLBLADDER WITH CHRONIC CHOLECYSTITIS WITHOUT OBSTRUCTION: ICD-10-CM

## 2024-11-11 DIAGNOSIS — F17.290 NICOTINE DEPENDENCE, OTHER TOBACCO PRODUCT, UNCOMPLICATED: ICD-10-CM

## 2024-11-11 DIAGNOSIS — F17.210 NICOTINE DEPENDENCE, CIGARETTES, UNCOMPLICATED: ICD-10-CM

## 2024-11-11 DIAGNOSIS — F32.A DEPRESSION, UNSPECIFIED: ICD-10-CM

## 2024-11-11 DIAGNOSIS — E66.9 OBESITY, UNSPECIFIED: ICD-10-CM

## 2024-11-20 ENCOUNTER — APPOINTMENT (OUTPATIENT)
Dept: SURGERY | Facility: CLINIC | Age: 24
End: 2024-11-20

## 2024-11-20 PROBLEM — Z72.0 TOBACCO USE: Chronic | Status: ACTIVE | Noted: 2024-10-29

## 2024-12-04 ENCOUNTER — NON-APPOINTMENT (OUTPATIENT)
Age: 24
End: 2024-12-04

## 2024-12-05 ENCOUNTER — EMERGENCY (EMERGENCY)
Facility: HOSPITAL | Age: 24
LOS: 0 days | Discharge: HOME | End: 2024-12-05
Attending: STUDENT IN AN ORGANIZED HEALTH CARE EDUCATION/TRAINING PROGRAM
Payer: MEDICAID

## 2024-12-05 VITALS
RESPIRATION RATE: 21 BRPM | WEIGHT: 257.94 LBS | OXYGEN SATURATION: 98 % | HEART RATE: 95 BPM | HEIGHT: 63.5 IN | SYSTOLIC BLOOD PRESSURE: 125 MMHG | TEMPERATURE: 98 F | DIASTOLIC BLOOD PRESSURE: 77 MMHG

## 2024-12-05 DIAGNOSIS — F98.8 OTHER SPECIFIED BEHAVIORAL AND EMOTIONAL DISORDERS WITH ONSET USUALLY OCCURRING IN CHILDHOOD AND ADOLESCENCE: ICD-10-CM

## 2024-12-05 DIAGNOSIS — R19.7 DIARRHEA, UNSPECIFIED: ICD-10-CM

## 2024-12-05 DIAGNOSIS — F32.A DEPRESSION, UNSPECIFIED: ICD-10-CM

## 2024-12-05 DIAGNOSIS — Z98.890 OTHER SPECIFIED POSTPROCEDURAL STATES: Chronic | ICD-10-CM

## 2024-12-05 DIAGNOSIS — U07.1 COVID-19: ICD-10-CM

## 2024-12-05 DIAGNOSIS — R50.9 FEVER, UNSPECIFIED: ICD-10-CM

## 2024-12-05 DIAGNOSIS — R53.1 WEAKNESS: ICD-10-CM

## 2024-12-05 DIAGNOSIS — Z90.89 ACQUIRED ABSENCE OF OTHER ORGANS: Chronic | ICD-10-CM

## 2024-12-05 LAB
APPEARANCE UR: CLEAR — SIGNIFICANT CHANGE UP
BILIRUB UR-MCNC: NEGATIVE — SIGNIFICANT CHANGE UP
COLOR SPEC: SIGNIFICANT CHANGE UP
DIFF PNL FLD: NEGATIVE — SIGNIFICANT CHANGE UP
FLUAV AG NPH QL: SIGNIFICANT CHANGE UP
FLUBV AG NPH QL: SIGNIFICANT CHANGE UP
GLUCOSE UR QL: NEGATIVE MG/DL — SIGNIFICANT CHANGE UP
KETONES UR-MCNC: ABNORMAL MG/DL
LEUKOCYTE ESTERASE UR-ACNC: NEGATIVE — SIGNIFICANT CHANGE UP
NITRITE UR-MCNC: NEGATIVE — SIGNIFICANT CHANGE UP
PH UR: 6 — SIGNIFICANT CHANGE UP (ref 5–8)
PROT UR-MCNC: 30 MG/DL
RSV RNA NPH QL NAA+NON-PROBE: SIGNIFICANT CHANGE UP
SARS-COV-2 RNA SPEC QL NAA+PROBE: DETECTED
SP GR SPEC: 1.03 — SIGNIFICANT CHANGE UP (ref 1–1.03)
UROBILINOGEN FLD QL: 1 MG/DL — SIGNIFICANT CHANGE UP (ref 0.2–1)

## 2024-12-05 PROCEDURE — 71046 X-RAY EXAM CHEST 2 VIEWS: CPT | Mod: 26

## 2024-12-05 PROCEDURE — 71046 X-RAY EXAM CHEST 2 VIEWS: CPT

## 2024-12-05 PROCEDURE — 81001 URINALYSIS AUTO W/SCOPE: CPT

## 2024-12-05 PROCEDURE — 96372 THER/PROPH/DIAG INJ SC/IM: CPT

## 2024-12-05 PROCEDURE — 99283 EMERGENCY DEPT VISIT LOW MDM: CPT | Mod: 25

## 2024-12-05 PROCEDURE — 99284 EMERGENCY DEPT VISIT MOD MDM: CPT

## 2024-12-05 PROCEDURE — 0241U: CPT

## 2024-12-05 RX ORDER — KETOROLAC TROMETHAMINE 30 MG/ML
30 INJECTION INTRAMUSCULAR; INTRAVENOUS ONCE
Refills: 0 | Status: DISCONTINUED | OUTPATIENT
Start: 2024-12-05 | End: 2024-12-05

## 2024-12-05 RX ORDER — ACETAMINOPHEN 500MG 500 MG/1
975 TABLET, COATED ORAL ONCE
Refills: 0 | Status: DISCONTINUED | OUTPATIENT
Start: 2024-12-05 | End: 2024-12-05

## 2024-12-05 RX ORDER — ACETAMINOPHEN 500MG 500 MG/1
975 TABLET, COATED ORAL ONCE
Refills: 0 | Status: COMPLETED | OUTPATIENT
Start: 2024-12-05 | End: 2024-12-05

## 2024-12-05 RX ORDER — DEXAMETHASONE 1.5 MG/1
10 TABLET ORAL ONCE
Refills: 0 | Status: COMPLETED | OUTPATIENT
Start: 2024-12-05 | End: 2024-12-05

## 2024-12-05 RX ORDER — DIPHENHYDRAMINE HYDROCHLORIDE AND LIDOCAINE HYDROCHLORIDE AND ALUMINUM HYDROXIDE AND MAGNESIUM HYDRO
30 KIT ONCE
Refills: 0 | Status: COMPLETED | OUTPATIENT
Start: 2024-12-05 | End: 2024-12-05

## 2024-12-05 RX ADMIN — DEXAMETHASONE 10 MILLIGRAM(S): 1.5 TABLET ORAL at 16:20

## 2024-12-05 RX ADMIN — ACETAMINOPHEN 500MG 975 MILLIGRAM(S): 500 TABLET, COATED ORAL at 16:19

## 2024-12-05 RX ADMIN — KETOROLAC TROMETHAMINE 30 MILLIGRAM(S): 30 INJECTION INTRAMUSCULAR; INTRAVENOUS at 16:21

## 2024-12-05 RX ADMIN — DIPHENHYDRAMINE HYDROCHLORIDE AND LIDOCAINE HYDROCHLORIDE AND ALUMINUM HYDROXIDE AND MAGNESIUM HYDRO 30 MILLILITER(S): KIT at 16:41

## 2024-12-05 NOTE — ED ADULT TRIAGE NOTE - CHIEF COMPLAINT QUOTE
Patient states she was seen at  yesterday and diagnosed with pneumonia, none of the medicines they gave her are working. She feels sob and coughing.

## 2024-12-05 NOTE — ED PROVIDER NOTE - ATTENDING CONTRIBUTION TO CARE
24 yo f   pt here for eval of cough, body aches chills. sx for several days. [facility] on ? abx for possible pna. pt states she is having strong myalgias. pt denies taking anything for pain. no ap, dysuria.      vss  gen-  aaox3  card-rrr  lungs-ctab, no wheezing or rhonchi  abd-sntnd, no guarding or rebound  neuro- full str/sensation, cn ii-xii grossly intact, normal coordination  HENT- no lip/tongue/uvula/tonsillar swelling, no exudates on tonsils, uvula midline, base of tongue normal, pt tolerating secretions. no drooling, no stridor.  no hot potato voice

## 2024-12-05 NOTE — ED PROVIDER NOTE - PATIENT PORTAL LINK FT
You can access the FollowMyHealth Patient Portal offered by [facility] by registering at the following website: [email or web address]. By joining [facility]’s FollowMyHealth portal, you will also be able to view your health information using other applications (apps) compatible with our system.

## 2024-12-05 NOTE — ED PROVIDER NOTE - OBJECTIVE STATEMENT
23-year-old female with history of obesity, PCOS, ADD, depression, presents to ED for evaluation of multiple complaints.  Patient reports having subjective fever and chills over the last 5 days associated with sore throat, generalized weakness and bodyaches.  Also notes 2 episodes of diarrhea over the last few days that is not black or bloody.  Denies any sick contacts, CP, SOB, nausea or vomiting, abdominal pain, urinary symptoms, abnormal vaginal bleeding or discharge, rash.  States she does not get her periods due to her PCOS.

## 2024-12-05 NOTE — ED ADULT TRIAGE NOTE - BMI (KG/M2)
Rx Refill Note  Requested Prescriptions     Pending Prescriptions Disp Refills   • finasteride (PROSCAR) 5 MG tablet [Pharmacy Med Name: FINASTERIDE 5 MG TABLET] 90 tablet 2     Sig: TAKE 1 TABLET BY MOUTH EVERY DAY      Last office visit with prescribing clinician: 1/6/2021      Next office visit with prescribing clinician: Visit date not found   CPE done 01/2022    {TIP  Please add Last Relevant Lab Date if appropriate 01/20/2022    {TIP  Is Refill Pharmacy correct?: yes    Nay Live MA  04/05/22, 11:36 CDT    
45
Daily Assessment

## 2024-12-05 NOTE — ED PROVIDER NOTE - CLINICAL SUMMARY MEDICAL DECISION MAKING FREE TEXT BOX
Throughout ED observation period, pt remained clinically and hemodynamically stable.  no resp distress  cxr as ready by attending radiologist negative  covid + which is consistent w/ sx  ed supportive care given, home care instructions given

## 2024-12-05 NOTE — ED PROVIDER NOTE - NSFOLLOWUPINSTRUCTIONS_ED_ALL_ED_FT
Viral Illness, Adult  Viruses are tiny germs that can get into a person's body and cause illness. There are many different types of viruses. And they cause many types of illness. Viral illnesses can range from mild to severe. They can affect various parts of the body.    Short-term conditions that are caused by a virus include colds and flu (influenza) and stomach viruses. Long-term conditions that are caused by a virus include herpes, shingles, and human immunodeficiency virus (HIV) infection. A few viruses have been linked to certain cancers.    What are the causes?  Many types of viruses can cause illness. Viruses get into cells in your body, multiply, and cause the infected cells to work differently or die. When these cells die, they release more of the virus. When this happens, you get symptoms of the illness and the virus spreads to other cells. If the virus takes over how the cell works, it can cause the cell to divide and grow out of control. This happens when a virus causes cancer.    Different viruses get into the body in different ways. You can get a virus by:  Swallowing food or water that has come in contact with the virus.  Breathing in droplets that have been coughed or sneezed into the air by an infected person.  Touching a surface that has the virus on it and then touching your eyes, nose, or mouth.  Being bitten by an insect or animal that carries the virus.  Having sexual contact with a person who is infected with the virus.  Being exposed to blood or fluids that contain the virus, either through an open cut or during a transfusion.  If a virus enters your body, your body's disease-fighting system (immune system) will try to fight the virus. You may be at higher risk for a viral illness if your immune system is weak.    What are the signs or symptoms?  Symptoms depend on the type of virus and the location of the cells that it gets into. Symptoms can include:  For cold and flu viruses:  Fever.  Headache.  Sore throat.  Muscle aches.  Stuffy nose (nasal congestion).  Cough.  For stomach (gastrointestinal) viruses:  Fever.  Pain in the abdomen.  Nausea or vomiting.  Diarrhea.  For liver viruses (hepatitis):  Loss of appetite.  Feeling tired.  Skin or the white parts of your eyes turning yellow (jaundice).  For brain and spinal cord viruses:  Fever.  Headache.  Stiff neck.  Nausea and vomiting.  Confusion or being sleepy.  For skin viruses:  Warts.  Itching.  Rash.  For sexually transmitted viruses:  Discharge.  Swelling.  Redness.  Rash.  How is this diagnosed?  This condition may be diagnosed based on one or more of these:  Your symptoms and medical history.  A physical exam.  Tests, such as:  Blood tests.  Tests on a sample of mucus from your lungs (sputum sample).  Tests on a poop (stool) sample.  Tests on a swab of body fluids or a skin sore (lesion).  How is this treated?  Viruses can be hard to treat because they live within cells. Antibiotics do not treat viruses because these medicines do not get inside cells. Treatment for a viral illness may include:  Resting and drinking a lot of fluids.  Medicines to treat symptoms. These can include over-the-counter medicine for pain and fever, medicines for cough or congestion, and medicines for diarrhea.  Antiviral medicines. These medicines are available only for certain types of viruses.  Some viral illnesses can be prevented with vaccinations. A common example is the flu shot.    Follow these instructions at home:  Medicines    Take over-the-counter and prescription medicines only as told by your health care provider.  If you were prescribed an antiviral medicine, take it as told by your provider. Do not stop taking the antiviral even if you start to feel better.  Know when antibiotics are needed and when they are not needed. Antibiotics do not treat viruses. You may get an antibiotic if your provider thinks that you may have, or are at risk for, a bacterial infection and you have a viral infection.  Do not ask for an antibiotic prescription if you have been diagnosed with a viral illness. Antibiotics will not make your illness go away faster.  Taking antibiotics when they are not needed can lead to antibiotic resistance. When this develops, the medicine no longer works against the bacteria that it normally fights.  General instructions    Drink enough fluids to keep your pee (urine) pale yellow.  Rest as much as possible.  Return to your normal activities as told by your provider. Ask your provider what activities are safe for you.  How is this prevented?  A person washing hands with soap and water.  To lower your risk of getting another viral illness:  Wash your hands often with soap and water for at least 20 seconds. If soap and water are not available, use hand .  Avoid touching your nose, eyes, and mouth, especially if you have not washed your hands recently.  If anyone in your household has a viral infection, clean all household surfaces that may have been in contact with the virus. Use soap and hot water. You may also use a commercially prepared, bleach-containing solution.  Stay away from people who are sick with symptoms of a viral infection.  Do not share items such as toothbrushes and water bottles with other people.  Keep your vaccinations up to date. This includes getting a yearly flu shot.  Eat a healthy diet and get plenty of rest.  Contact a health care provider if:  You have symptoms of a viral illness that do not go away.  Your symptoms come back after going away.  Your symptoms get worse.  Get help right away if:  You have trouble breathing.  You have a severe headache or a stiff neck.  You have severe vomiting or pain in your abdomen.  These symptoms may be an emergency. Get help right away. Call 911.  Do not wait to see if the symptoms will go away.  Do not drive yourself to the hospital.  This information is not intended to replace advice given to you by your health care provider. Make sure you discuss any questions you have with your health care provider.

## 2024-12-05 NOTE — ED PROVIDER NOTE - PHYSICAL EXAMINATION
VITAL SIGNS: I have reviewed nursing notes and confirm.  CONSTITUTIONAL: Well-developed; well-nourished; in no acute distress.  EYES: PERRL,  conjunctiva and sclera clear.  ENT: MMM, OP clear. No erythema/exudates/tonsillar hypertrophy. Uvula is midline. No pooling of secretions. Normal voice.   NECK: Supple; non tender.  CARD: S1, S2 normal; no murmurs, gallops, or rubs. Regular rate and rhythm.  RESP: Normal respiratory effort, no tachypnea or distress. Lungs CTAB, no wheezes, rales or rhonchi.  ABD: soft, NT/ND.  EXT: Normal ROM. No clubbing, cyanosis or edema.  NEURO: Alert, oriented. Grossly unremarkable. No focal deficits.  PSYCH: Cooperative, appropriate.

## 2024-12-06 PROBLEM — K80.20 CALCULUS OF GALLBLADDER WITHOUT CHOLECYSTITIS WITHOUT OBSTRUCTION: Chronic | Status: ACTIVE | Noted: 2024-10-29

## 2024-12-06 PROBLEM — E66.9 OBESITY, UNSPECIFIED: Chronic | Status: ACTIVE | Noted: 2024-10-29

## 2025-01-09 ENCOUNTER — APPOINTMENT (OUTPATIENT)
Dept: SURGERY | Facility: CLINIC | Age: 25
End: 2025-01-09
Payer: MEDICAID

## 2025-01-09 VITALS
DIASTOLIC BLOOD PRESSURE: 80 MMHG | BODY MASS INDEX: 45.32 KG/M2 | SYSTOLIC BLOOD PRESSURE: 130 MMHG | WEIGHT: 259 LBS | HEART RATE: 104 BPM | HEIGHT: 63.5 IN | OXYGEN SATURATION: 96 %

## 2025-01-09 PROCEDURE — G2211 COMPLEX E/M VISIT ADD ON: CPT | Mod: NC

## 2025-01-09 PROCEDURE — 99213 OFFICE O/P EST LOW 20 MIN: CPT | Mod: 24

## 2025-01-09 RX ORDER — SEMAGLUTIDE 0.25 MG/.5ML
0.25 INJECTION, SOLUTION SUBCUTANEOUS
Qty: 1 | Refills: 0 | Status: ACTIVE | COMMUNITY
Start: 2025-01-09 | End: 1900-01-01

## 2025-01-09 RX ORDER — SEMAGLUTIDE 0.5 MG/.5ML
0.5 INJECTION, SOLUTION SUBCUTANEOUS
Qty: 1 | Refills: 0 | Status: ACTIVE | COMMUNITY
Start: 2025-01-09 | End: 1900-01-01

## 2025-01-16 ENCOUNTER — APPOINTMENT (OUTPATIENT)
Dept: SURGERY | Facility: CLINIC | Age: 25
End: 2025-01-16
Payer: MEDICAID

## 2025-01-16 VITALS — BODY MASS INDEX: 46.02 KG/M2 | HEIGHT: 63.5 IN | WEIGHT: 263 LBS

## 2025-01-16 PROCEDURE — 99211 OFF/OP EST MAY X REQ PHY/QHP: CPT | Mod: 24

## 2025-01-16 RX ORDER — ONDANSETRON 4 MG/1
4 TABLET, ORALLY DISINTEGRATING ORAL EVERY 8 HOURS
Qty: 30 | Refills: 0 | Status: ACTIVE | COMMUNITY
Start: 2025-01-16 | End: 1900-01-01

## 2025-02-05 ENCOUNTER — NON-APPOINTMENT (OUTPATIENT)
Age: 25
End: 2025-02-05

## 2025-02-05 RX ORDER — SEMAGLUTIDE 1 MG/.5ML
1 INJECTION, SOLUTION SUBCUTANEOUS
Qty: 1 | Refills: 3 | Status: ACTIVE | COMMUNITY
Start: 2025-02-05 | End: 1900-01-01

## 2025-02-20 ENCOUNTER — APPOINTMENT (OUTPATIENT)
Dept: SURGERY | Facility: CLINIC | Age: 25
End: 2025-02-20
Payer: MEDICAID

## 2025-02-20 PROCEDURE — 97802 MEDICAL NUTRITION INDIV IN: CPT | Mod: NC,95

## 2025-02-20 NOTE — ED PROVIDER NOTE - MDM PATIENT STATEMENT FOR ADDL TREATMENT
The following recommendations are based on today's review of your patient's CT lung scan by the Thoracic Case Conference multidisciplinary team:     RECOMMENDATION(S):  PET CT ([id number]).   Additional recommendation(s):  Then Biopsy . Please refer to pulmonologist who can perform possible robotic assisted bronchoscopy and/or endobronchial ultrasound biopsy.     If you are in agreement with these recommendations, please order any indicated tests (please only use indicated IMG codes) or consults. Please update your patient regarding the above recommendation(s) if indicated, along with the risks, benefits, and alternatives to the recommendations. Please note, the above are only recommendations, and the final decision for follow up should be determined by you as the patient's provider.     Results for orders placed during the hospital encounter of 02/10/25  CT LUNG CANCER SCREENING LOW DOSE WO CONTRAST  Impression  Slightly increase in size of left right upper lung suprahilar  peribronchial nodule.    There is fluid density fullness in the visualized bilateral upper kidney  which may represent cysts or dilatation of the renal pelvis. Please call  clinically. There is suggestion of a 1.2 cm low-density left adrenal mass,  unchanged most likely adenoma.    LungRADS CATEGORY: 4A - Suspicious. Findings for which additional  diagnostic testing and/or tissue sampling is recommended. MANAGEMENT: 3 Month LDCT; PET/CT may be used when there is a >=8 mm solid component.      Routed to Dr. Pierre. Pt has appt on 2/28/25   Patient with one or more new problems requiring additional work-up/treatment.

## 2025-02-21 VITALS — BODY MASS INDEX: 44.9 KG/M2 | WEIGHT: 256.6 LBS | HEIGHT: 63.5 IN

## 2025-03-05 NOTE — ED PEDIATRIC TRIAGE NOTE - ARRIVAL FROM
Home Detail Level: Zone Continue Regimen: Winlevi 1 % topical cream \\nQuantity: 60.0 g  Days Supply: 30\\nSig: Apply a pea size amount to the entire face twice daily Initiate Treatment: tretinoin 0.1 % topical cream \\nQuantity: 45.0 g  Days Supply: 30\\nSig: Apply to affected areas on the face every other night, building to nightly as tolerated Render In Strict Bullet Format?: No

## 2025-03-19 ENCOUNTER — APPOINTMENT (OUTPATIENT)
Dept: SURGERY | Facility: CLINIC | Age: 25
End: 2025-03-19
Payer: MEDICAID

## 2025-03-19 VITALS
SYSTOLIC BLOOD PRESSURE: 124 MMHG | OXYGEN SATURATION: 98 % | HEIGHT: 63.5 IN | TEMPERATURE: 97 F | WEIGHT: 243 LBS | BODY MASS INDEX: 42.52 KG/M2 | HEART RATE: 102 BPM | DIASTOLIC BLOOD PRESSURE: 84 MMHG

## 2025-03-19 PROCEDURE — 99213 OFFICE O/P EST LOW 20 MIN: CPT

## 2025-04-02 RX ORDER — ONDANSETRON 4 MG/1
4 TABLET ORAL EVERY 8 HOURS
Qty: 15 | Refills: 2 | Status: ACTIVE | COMMUNITY
Start: 2025-04-02 | End: 1900-01-01

## 2025-04-08 ENCOUNTER — APPOINTMENT (OUTPATIENT)
Dept: SURGERY | Facility: CLINIC | Age: 25
End: 2025-04-08
Payer: MEDICAID

## 2025-04-08 PROCEDURE — 97803 MED NUTRITION INDIV SUBSEQ: CPT | Mod: NC,95

## 2025-04-09 VITALS — WEIGHT: 236 LBS | BODY MASS INDEX: 41.3 KG/M2 | HEIGHT: 63.5 IN

## 2025-05-29 ENCOUNTER — APPOINTMENT (OUTPATIENT)
Dept: SURGERY | Facility: CLINIC | Age: 25
End: 2025-05-29
Payer: MEDICAID

## 2025-05-29 PROCEDURE — 97803 MED NUTRITION INDIV SUBSEQ: CPT | Mod: NC,95

## 2025-05-30 VITALS — BODY MASS INDEX: 39.95 KG/M2 | HEIGHT: 63.5 IN | WEIGHT: 228.3 LBS

## 2025-06-05 ENCOUNTER — APPOINTMENT (OUTPATIENT)
Dept: SURGERY | Facility: CLINIC | Age: 25
End: 2025-06-05
Payer: MEDICAID

## 2025-06-05 VITALS
TEMPERATURE: 97 F | HEART RATE: 113 BPM | SYSTOLIC BLOOD PRESSURE: 110 MMHG | DIASTOLIC BLOOD PRESSURE: 86 MMHG | OXYGEN SATURATION: 95 % | HEIGHT: 63.5 IN | WEIGHT: 222 LBS | BODY MASS INDEX: 38.85 KG/M2

## 2025-06-05 PROCEDURE — G2211 COMPLEX E/M VISIT ADD ON: CPT | Mod: NC

## 2025-06-05 PROCEDURE — 99213 OFFICE O/P EST LOW 20 MIN: CPT

## 2025-06-05 RX ORDER — SEMAGLUTIDE 1.7 MG/.75ML
1.7 INJECTION, SOLUTION SUBCUTANEOUS
Qty: 1 | Refills: 2 | Status: ACTIVE | COMMUNITY
Start: 2025-06-05 | End: 1900-01-01

## 2025-07-07 NOTE — H&P PST ADULT - NS HP PST LATEX ALLERGY
Forms/Letter Request    Type of form/letter: Camp       Is Release of Information needed?: No    Do we have the form/letter: Yes: CMT FOLDER    Who is the form from? Patient    Where did/will the form come from? Patient or family brought in       When is form/letter needed by: ASAP    How would you like the form/letter returned:     Patient Notified form requests are processed in 5-7 business days:Yes    Could we send this information to you in [employer] or would you prefer to receive a phone call?:   Patient would prefer a phone call   Okay to leave a detailed message?: Yes at Cell number on file:    Telephone Information:   Mobile [phone]       No

## 2025-07-17 ENCOUNTER — APPOINTMENT (OUTPATIENT)
Dept: SURGERY | Facility: CLINIC | Age: 25
End: 2025-07-17
Payer: MEDICAID

## 2025-07-17 PROCEDURE — 97803 MED NUTRITION INDIV SUBSEQ: CPT | Mod: NC,95

## 2025-07-18 VITALS — HEIGHT: 63.5 IN | WEIGHT: 220 LBS | BODY MASS INDEX: 38.5 KG/M2

## 2025-08-07 ENCOUNTER — APPOINTMENT (OUTPATIENT)
Dept: SURGERY | Facility: CLINIC | Age: 25
End: 2025-08-07
Payer: MEDICAID

## 2025-08-07 VITALS
WEIGHT: 207 LBS | TEMPERATURE: 97 F | SYSTOLIC BLOOD PRESSURE: 112 MMHG | HEIGHT: 63.5 IN | BODY MASS INDEX: 36.22 KG/M2 | OXYGEN SATURATION: 98 % | HEART RATE: 97 BPM | DIASTOLIC BLOOD PRESSURE: 78 MMHG

## 2025-08-07 DIAGNOSIS — E66.9 OBESITY, UNSPECIFIED: ICD-10-CM

## 2025-08-07 PROCEDURE — 99213 OFFICE O/P EST LOW 20 MIN: CPT

## 2025-08-07 PROCEDURE — G2211 COMPLEX E/M VISIT ADD ON: CPT | Mod: NC

## 2025-08-07 RX ORDER — SEMAGLUTIDE 1.7 MG/.75ML
1.7 INJECTION, SOLUTION SUBCUTANEOUS
Qty: 1 | Refills: 3 | Status: ACTIVE | COMMUNITY
Start: 2025-08-07 | End: 1900-01-01